# Patient Record
Sex: FEMALE | Race: BLACK OR AFRICAN AMERICAN | NOT HISPANIC OR LATINO | Employment: FULL TIME | URBAN - METROPOLITAN AREA
[De-identification: names, ages, dates, MRNs, and addresses within clinical notes are randomized per-mention and may not be internally consistent; named-entity substitution may affect disease eponyms.]

---

## 2019-04-09 ENCOUNTER — CONVERSION ENCOUNTER (OUTPATIENT)
Dept: INTERNAL MEDICINE | Facility: CLINIC | Age: 54
End: 2019-04-09

## 2019-04-09 ENCOUNTER — OFFICE VISIT CONVERTED (OUTPATIENT)
Dept: INTERNAL MEDICINE | Facility: CLINIC | Age: 54
End: 2019-04-09
Attending: INTERNAL MEDICINE

## 2019-05-22 ENCOUNTER — OFFICE VISIT CONVERTED (OUTPATIENT)
Dept: INTERNAL MEDICINE | Facility: CLINIC | Age: 54
End: 2019-05-22
Attending: PHYSICIAN ASSISTANT

## 2019-05-22 ENCOUNTER — HOSPITAL ENCOUNTER (OUTPATIENT)
Dept: OTHER | Facility: HOSPITAL | Age: 54
Discharge: HOME | End: 2019-05-22
Attending: PHYSICIAN ASSISTANT

## 2019-05-26 ENCOUNTER — HOSPITAL ENCOUNTER (OUTPATIENT)
Dept: URGENT CARE | Facility: CLINIC | Age: 54
Discharge: HOME | End: 2019-05-26

## 2019-05-28 LAB — BACTERIA SPEC AEROBE CULT: NORMAL

## 2019-09-16 ENCOUNTER — OFFICE VISIT CONVERTED (OUTPATIENT)
Dept: INTERNAL MEDICINE | Facility: CLINIC | Age: 54
End: 2019-09-16
Attending: INTERNAL MEDICINE

## 2019-10-29 ENCOUNTER — CONVERSION ENCOUNTER (OUTPATIENT)
Dept: OTHER | Facility: HOSPITAL | Age: 54
End: 2019-10-29

## 2019-10-29 ENCOUNTER — OFFICE VISIT CONVERTED (OUTPATIENT)
Dept: CARDIOLOGY | Facility: CLINIC | Age: 54
End: 2019-10-29
Attending: INTERNAL MEDICINE

## 2019-11-15 ENCOUNTER — CONVERSION ENCOUNTER (OUTPATIENT)
Dept: CARDIOLOGY | Facility: CLINIC | Age: 54
End: 2019-11-15
Attending: INTERNAL MEDICINE

## 2019-12-20 ENCOUNTER — HOSPITAL ENCOUNTER (OUTPATIENT)
Dept: GASTROENTEROLOGY | Facility: HOSPITAL | Age: 54
Setting detail: HOSPITAL OUTPATIENT SURGERY
Discharge: HOME | End: 2019-12-20
Attending: INTERNAL MEDICINE

## 2020-03-03 ENCOUNTER — OFFICE VISIT CONVERTED (OUTPATIENT)
Dept: INTERNAL MEDICINE | Facility: CLINIC | Age: 55
End: 2020-03-03
Attending: INTERNAL MEDICINE

## 2020-03-16 ENCOUNTER — OFFICE VISIT CONVERTED (OUTPATIENT)
Dept: INTERNAL MEDICINE | Facility: CLINIC | Age: 55
End: 2020-03-16
Attending: INTERNAL MEDICINE

## 2020-03-30 ENCOUNTER — HOSPITAL ENCOUNTER (OUTPATIENT)
Dept: GENERAL RADIOLOGY | Facility: HOSPITAL | Age: 55
Discharge: HOME | End: 2020-03-30
Attending: INTERNAL MEDICINE

## 2020-04-16 ENCOUNTER — TELEMEDICINE CONVERTED (OUTPATIENT)
Dept: NEUROSURGERY | Facility: CLINIC | Age: 55
End: 2020-04-16
Attending: PHYSICIAN ASSISTANT

## 2021-02-12 ENCOUNTER — HOSPITAL ENCOUNTER (OUTPATIENT)
Dept: OTHER | Facility: HOSPITAL | Age: 56
Discharge: HOME | End: 2021-02-12
Attending: PHYSICIAN ASSISTANT

## 2021-02-12 ENCOUNTER — OFFICE VISIT CONVERTED (OUTPATIENT)
Dept: INTERNAL MEDICINE | Facility: CLINIC | Age: 56
End: 2021-02-12
Attending: PHYSICIAN ASSISTANT

## 2021-02-12 LAB
ALBUMIN SERPL-MCNC: 4.4 G/DL (ref 3.5–5)
ALBUMIN/GLOB SERPL: 1.7 {RATIO} (ref 1.4–2.6)
ALP SERPL-CCNC: 64 U/L (ref 53–141)
ALT SERPL-CCNC: 19 U/L (ref 10–40)
ANION GAP SERPL CALC-SCNC: 13 MMOL/L (ref 8–19)
AST SERPL-CCNC: 16 U/L (ref 15–50)
BASOPHILS # BLD AUTO: 0.01 10*3/UL (ref 0–0.2)
BASOPHILS NFR BLD AUTO: 0.2 % (ref 0–3)
BILIRUB SERPL-MCNC: 0.17 MG/DL (ref 0.2–1.3)
BUN SERPL-MCNC: 15 MG/DL (ref 5–25)
BUN/CREAT SERPL: 25 {RATIO} (ref 6–20)
CALCIUM SERPL-MCNC: 9.2 MG/DL (ref 8.7–10.4)
CHLORIDE SERPL-SCNC: 103 MMOL/L (ref 99–111)
CONV ABS IMM GRAN: 0.01 10*3/UL (ref 0–0.2)
CONV CO2: 27 MMOL/L (ref 22–32)
CONV IMMATURE GRAN: 0.2 % (ref 0–1.8)
CONV TOTAL PROTEIN: 7 G/DL (ref 6.3–8.2)
CREAT UR-MCNC: 0.6 MG/DL (ref 0.5–0.9)
DEPRECATED RDW RBC AUTO: 48.1 FL (ref 36.4–46.3)
EOSINOPHIL # BLD AUTO: 0.12 10*3/UL (ref 0–0.7)
EOSINOPHIL # BLD AUTO: 2.6 % (ref 0–7)
ERYTHROCYTE [DISTWIDTH] IN BLOOD BY AUTOMATED COUNT: 14.9 % (ref 11.7–14.4)
GFR SERPLBLD BASED ON 1.73 SQ M-ARVRAT: >60 ML/MIN/{1.73_M2}
GLOBULIN UR ELPH-MCNC: 2.6 G/DL (ref 2–3.5)
GLUCOSE SERPL-MCNC: 97 MG/DL (ref 65–99)
HCT VFR BLD AUTO: 41 % (ref 37–47)
HGB BLD-MCNC: 13.2 G/DL (ref 12–16)
LYMPHOCYTES # BLD AUTO: 1.65 10*3/UL (ref 1–5)
LYMPHOCYTES NFR BLD AUTO: 36.2 % (ref 20–45)
MCH RBC QN AUTO: 28.3 PG (ref 27–31)
MCHC RBC AUTO-ENTMCNC: 32.2 G/DL (ref 33–37)
MCV RBC AUTO: 88 FL (ref 81–99)
MONOCYTES # BLD AUTO: 0.43 10*3/UL (ref 0.2–1.2)
MONOCYTES NFR BLD AUTO: 9.4 % (ref 3–10)
NEUTROPHILS # BLD AUTO: 2.34 10*3/UL (ref 2–8)
NEUTROPHILS NFR BLD AUTO: 51.4 % (ref 30–85)
NRBC CBCN: 0 % (ref 0–0.7)
OSMOLALITY SERPL CALC.SUM OF ELEC: 289 MOSM/KG (ref 273–304)
PLATELET # BLD AUTO: 186 10*3/UL (ref 130–400)
PMV BLD AUTO: 11.7 FL (ref 9.4–12.3)
POTASSIUM SERPL-SCNC: 3.7 MMOL/L (ref 3.5–5.3)
RBC # BLD AUTO: 4.66 10*6/UL (ref 4.2–5.4)
SODIUM SERPL-SCNC: 139 MMOL/L (ref 135–147)
WBC # BLD AUTO: 4.56 10*3/UL (ref 4.8–10.8)

## 2021-05-12 NOTE — PROGRESS NOTES
Quick Note      Patient Name: Lizbeth Sheikh   Patient ID: [id number]   Sex: Female   YOB: 1965    Primary Care Provider: Matt Glover MD   Referring Provider: Matt Glover MD    Visit Date: April 16, 2020    Provider: Nicolasa Briscoe PA-C   Location: [facility]   Location Address: [address] [place] [address]  [id number]   Location Phone: [phone]          History Of Present Illness  Video Conferencing Visit  Lizbeth Sheikh is a 55 year old /Black female who is presenting for evaluation via video conferencing. Verbal consent obtained before beginning visit.   The following staff were present during this visit: MARY BETH Barnett      Zoom telemedicine meeting ID: [id number] lasted 15 minutes.    She is a new patient to our clinic.  Started with pain in her left bicep region around the end of December with NKI.  Pain did not go below the elbow.  Then developed some pressure in the lateral neck/shoulders and some pain across the scapular region.  She attended PT which has helped the left bicep pain.  The tension in the shoulders continues but not as bad as prior to PT.  Working now on home stretching activities.  Pain improves with neck stretching such as neck extension. She denies arm weakness/numbness.  Pain is mild currently.  Was afraid to take the gabapentin after reading the side effect profile.     MRI cervical spine showed a slight left C5/6 disc/osteophyte complex causing moderate left foraminal stenosis which correlates with her C6 radiculopathy.  She has a slight right disc/osteophyte complex at C6/7 with moderate right foraminal stenosis.  Central canal is patent and no signal change in the cord.  These were the most notable findings.       Vitals     none       Physical Examination     FROM of the cervical spine and shoulders.  Gait and station are wnl.           Assessment  · Cervical radiculopathy     723.4/M54.12  · Cervical  disc disorder     722.91/M50.90      Plan  · Medications  o Medications have been Reconciled  o Transition of Care or Provider Policy  · Instructions  o Her pain has improved therefore an ACDF was not recommended at C5/6. She will continue to work on neck stretching and strengthening exercises and f/u as needed. If pain progresses she may consider CESB.             Electronically Signed by: Nicolasa Briscoe PA-C -Author on April 16, 2020 10:24:35 AM

## 2021-05-14 VITALS
DIASTOLIC BLOOD PRESSURE: 72 MMHG | OXYGEN SATURATION: 100 % | WEIGHT: 192 LBS | BODY MASS INDEX: 31.99 KG/M2 | RESPIRATION RATE: 15 BRPM | HEART RATE: 87 BPM | SYSTOLIC BLOOD PRESSURE: 130 MMHG | TEMPERATURE: 99 F | HEIGHT: 65 IN

## 2021-05-14 NOTE — PROGRESS NOTES
Progress Note      Patient Name: Lizbeth Sheikh   Patient ID: [id number]   Sex: Female   YOB: 1965    Primary Care Provider: Matt Glover MD   Referring Provider: Matt Glover MD    Visit Date: February 12, 2021    Provider: Angeles Brand PA-C   Location: [facility]   Location Address: [address] [place] [address], [address]  [place], [place]  [id number]   Location Phone: [phone]          Chief Complaint  · burning sensation      History Of Present Illness  Lizbeth Sheikh is a 56 year old /Black female who presents for evaluation and treatment of:      burning sensation of R hip and groin area x5 days.  She has pain in lower R side of lower back.  Burning pain is constant. Pain is also achy at times.   Pain worse if she lays on that side.  Pain moves from lower back, down R buttock and around to R groin.   Denies swelling of area or discoloration. Denies rash or blisters.  Denies pain in legs. Denies numbness/tingling down leg.   Denies incontinence, saddle anesthesia.   denies extra lifting, pushing, pulling. Denies known injury or trauma.   Denies fever.  Denies hx of shingles.  She took motrin which helps slightly.       Past Medical History  Disease Name Date Onset Notes   Anemia --  --    Asthma --  --    Colon abnormality --  --    GERD (gastroesophageal reflux disease) --  --    Heart Murmur --  --    Hemorrhoids --  --    Hernia --  --    Night sweats --  --          Past Surgical History  Procedure Name Date Notes   Colonoscopy 2009 [place]   Hysterectomy --  --          Medication List  Name Date Started Instructions   albuterol sulfate inhalation  --    Linzess 145 mcg oral capsule  take 1 capsule (145 mcg) by oral route once daily on an empty stomach at least 30 minutes before 1st meal of the day   omeprazole 40 mg oral capsule,delayed release(DR/EC)  take 1 capsule (40 mg) by oral route once daily before a meal   Restasis 0.05 % ophthalmic  "(eye) dropperette  instill 1 drop into right eye by ophthalmic route every 12 hours   Singulair 10 mg oral tablet 03/16/2020 take 1 tablet (10 mg) by oral route once daily in the evening for 90 days   spironolactone 25 mg oral tablet 03/03/2020 take 1 tablet (25 mg) by oral route once daily for 90 days   vitamin B12-folic acid 1,000-400 mcg sublingual lozenge  --    Zaditor 0.025 % (0.035 %) ophthalmic (eye) drops 10/08/2019 instill 1 drop into affected eye(s) by ophthalmic route 2 times per day   Zyrtec 10 mg oral tablet  take 1 tablet (10 mg) by oral route once daily         Allergy List  Allergen Name Date Reaction Notes   iodine --  --  --        Allergies Reconciled  Family Medical History  Disease Name Relative/Age Notes   Breast Neoplasm, Malignant Sister/44   --    Brain Neoplasm, Malignant Sister/75   --    Heart Disease  --    Lung cancer  --    Diabetes Mellitus, Type II  --    No family history of colorectal cancer  --          Social History  Finding Status Start/Stop Quantity Notes   Alcohol Never --/-- --  --    Tobacco Former --/-- --  --          Immunizations  NameDate Admin Mfg Trade Name Lot Number Route Inj VIS Given VIS Publication   InfluenzaDeferred 03/16/2020 NE Not Entered  NE NE     Comments:          Vitals  Date Time BP Position Site L\R Cuff Size HR RR TEMP (F) WT  HT  BMI kg/m2 BSA m2 O2 Sat FR L/min FiO2 HC       03/03/2020 02:50 /78 Sitting    80 - R 16 98.2 195lbs 4oz 5'  5\" 32.49 2.02 98 %  21%    03/16/2020 08:18 /70 Sitting    80 - R  97.2 194lbs 2oz 5'  5\" 32.3 2.01 98 %      02/12/2021 03:49 /72 Sitting    87 - R 15 99 192lbs 0oz 5'  5\" 31.95 2 100 %            Physical Examination  · Constitutional  o Appearance  o : no acute distress, well-nourished  · Head and Face  o Head  o :   § Inspection  § : atraumatic, normocephalic  · Eyes  o Eyes  o : extraocular movements intact, no scleral icterus, no conjunctival injection  · Ears, Nose, Mouth and " Throat  o Ears  o :   § External Ears  § : normal  o Nose  o :   § Intranasal Exam  § : nares patent  o Oral Cavity  o :   § Oral Mucosa  § : moist mucous membranes  · Respiratory  o Respiratory Effort  o : breathing comfortably, symmetric chest rise  o Auscultation of Lungs  o : clear to asculatation bilaterally, no wheezes, rales, or rhonchii  · Cardiovascular  o Heart  o :   § Auscultation of Heart  § : regular rate and rhythm, no murmurs, rubs, or gallops  o Peripheral Vascular System  o :   § Extremities  § : no edema  · Lymphatic  o Neck  o : no lymphadenopathy present  o Supraclavicular Nodes  o : no supraclavicular nodes  · Skin and Subcutaneous Tissue  o General Inspection  o : no lesions present, no areas of discoloration, skin turgor normal  · Neurologic  o Mental Status Examination  o :   § Orientation  § : grossly oriented to person, place and time  o Gait and Station  o :   § Gait Screening  § : normal gait  · Psychiatric  o General  o : normal mood and affect     MSK: Tension noted on L spine R sided lower paraspinal muscles. NROM R hip and L spine               Assessment  · Low back pain     724.2/M54.5  Discussed ddx pain. Low concern for shingles due to no rash, ttp, and improvement with otc nsaid. Will start nsaid scheduled and muscle relaxer prn. . Do not use muscle relaxer before work, driving, or operating heavy machinery. Heat/ice for symptomatic relief. Patient advised to rest initially and then slowly increase activity level. Monitor changes in symptoms such as numbness, tingling or weakness in legs, changes in bowel or bladder habits or worsening back pain. Proper ergonomics discussed. PT will let us know if no improvement with conservative tx or sooner if sx worsen. Will get labs today since no recent labs in the past year to make sure kidney function can tolerate short course of scheduled nsaid.  · Right hip pain     719.45/M25.551  · Muscle  spasm     728.85/M62.838      Plan  · Orders  o CBC with Auto Diff [facility] ([id number]) - 724.2/M54.5, 719.45/M25.551, 728.85/M62.838 - 02/12/2021  o CMP [facility] ([id number]) - 724.2/M54.5, 719.45/M25.551, 728.85/M62.838 - 02/12/2021  o ACO-39: Current medications updated and reviewed (, 1159F) - - 02/12/2021  · Medications  o cyclobenzaprine 10 mg oral tablet   SIG: take 1 tablet by oral route daily prn muscle spasm   DISP: (30) Tablet with 0 refills  Prescribed on 02/12/2021     o Medications have been Reconciled  o Transition of Care or Provider Policy  · Instructions  o Handouts were given to patient: lbp  o Patient was educated/instructed on their diagnosis, treatment and medications prior to discharge from the clinic today.  o Electronically Identified Patient Education Materials Provided Electronically  · Disposition  o Call or Return if symptoms worsen or persist.  o Keep follow up appt as scheduled            Electronically Signed by: Angeles Brand PA-C -Author on February 13, 2021 08:50:17 AM  Electronically Co-signed by: Rekha Andres MD -Reviewer on February 15, 2021 11:15:41 AM

## 2021-05-15 VITALS
BODY MASS INDEX: 32.34 KG/M2 | HEIGHT: 65 IN | DIASTOLIC BLOOD PRESSURE: 70 MMHG | SYSTOLIC BLOOD PRESSURE: 122 MMHG | TEMPERATURE: 97.2 F | HEART RATE: 80 BPM | OXYGEN SATURATION: 98 % | WEIGHT: 194.12 LBS

## 2021-05-15 VITALS
OXYGEN SATURATION: 97 % | BODY MASS INDEX: 31.49 KG/M2 | SYSTOLIC BLOOD PRESSURE: 126 MMHG | HEIGHT: 65 IN | WEIGHT: 189 LBS | DIASTOLIC BLOOD PRESSURE: 74 MMHG | TEMPERATURE: 98.6 F | HEART RATE: 66 BPM

## 2021-05-15 VITALS
HEART RATE: 79 BPM | SYSTOLIC BLOOD PRESSURE: 139 MMHG | HEIGHT: 65 IN | WEIGHT: 188.12 LBS | BODY MASS INDEX: 31.34 KG/M2 | DIASTOLIC BLOOD PRESSURE: 62 MMHG

## 2021-05-15 VITALS
SYSTOLIC BLOOD PRESSURE: 124 MMHG | TEMPERATURE: 98.7 F | HEIGHT: 65 IN | WEIGHT: 191 LBS | BODY MASS INDEX: 31.82 KG/M2 | DIASTOLIC BLOOD PRESSURE: 68 MMHG | HEART RATE: 71 BPM | OXYGEN SATURATION: 98 %

## 2021-05-15 VITALS
HEART RATE: 80 BPM | DIASTOLIC BLOOD PRESSURE: 78 MMHG | TEMPERATURE: 98.2 F | OXYGEN SATURATION: 98 % | RESPIRATION RATE: 16 BRPM | WEIGHT: 195.25 LBS | HEIGHT: 65 IN | SYSTOLIC BLOOD PRESSURE: 126 MMHG | BODY MASS INDEX: 32.53 KG/M2

## 2021-05-15 VITALS
WEIGHT: 188.12 LBS | DIASTOLIC BLOOD PRESSURE: 88 MMHG | SYSTOLIC BLOOD PRESSURE: 124 MMHG | TEMPERATURE: 97 F | HEIGHT: 65 IN | BODY MASS INDEX: 31.34 KG/M2 | HEART RATE: 82 BPM | OXYGEN SATURATION: 98 %

## 2021-05-17 ENCOUNTER — CONVERSION ENCOUNTER (OUTPATIENT)
Dept: CARDIOLOGY | Facility: CLINIC | Age: 56
End: 2021-05-17

## 2021-05-17 ENCOUNTER — OFFICE VISIT CONVERTED (OUTPATIENT)
Dept: CARDIOLOGY | Facility: CLINIC | Age: 56
End: 2021-05-17
Attending: INTERNAL MEDICINE

## 2021-05-27 ENCOUNTER — TRANSCRIBE ORDERS (OUTPATIENT)
Dept: ADMINISTRATIVE | Facility: HOSPITAL | Age: 56
End: 2021-05-27

## 2021-05-27 DIAGNOSIS — N64.4 MASTODYNIA: Primary | ICD-10-CM

## 2021-06-05 NOTE — H&P
History and Physical      Patient Name: Lizbeth Sheikh   Patient ID: [id number]   Sex: Female   YOB: 1965    Primary Care Provider: Matt Glover MD   Referring Provider: Matt Glover MD    Visit Date: May 17, 2021    Provider: Rohan De La O MD   Location: [facility]   Location Address: [address] [place] [address]  [id number]          History Of Present Illness  Consult requested by: Matt Glover MD   Lizbeth Sheikh is a 56-year-old -American/Black female who was seen by me in the office today. She has no previous cardiac history. A couple of months ago she started to have left arm discomfort and shoulder discomfort. This settled then in the left lower chest, described as sharp pain, worse with deep breath or certain movements. It lasted for two weeks and then went away completely. She did go to the emergency room for further evaluation. Her workup there was negative. [facility] [place] She was called and told that these were negative, but I do not have these reports in hand yet. She otherwise stays relatively active and is not having exertional symptoms at all at this point.   PAST MEDICAL HISTORY: Sleep apnea; heart murmur; arthritis.   PSYCHOSOCIAL HISTORY: The patient is single. No history of mood change or depression. Denies alcohol or tobacco use. Daily caffeine use.   FAMILY HISTORY: Positive for diabetes and hypertension. Negative for heart disease.   CURRENT MEDICATIONS: Linzess 30 mg daily; Cetirizine 10 mg daily; Proventil p.r.n.; Advair daily; Omeprazole daily; Olopatadine daily; Restasis b.i.d.; Refresh four times a day; Flonase daily; vitamin D with calcium b.i.d.; Probiotic b.i.d.; Gabapentin 300 mg two tablets daily.   ALLERGIES: Iodine.       Review of Systems  · Constitutional  o Admits  o : fatigue, recent weight changes   o Denies  o : good general health  "lately  · Eyes  o Admits  o : double vision, blurred vision  · HENT  o Admits  o : hearing loss or ringing, chronic sinus problem  o Denies  o : swollen glands in neck  · Cardiovascular  o Admits  o : chest pain  o Denies  o : palpitations (fast, fluttering, or skipping beats), swelling (feet, ankles, hands), shortness of breath while walking or lying flat  · Respiratory  o Admits  o : asthma or wheezing  o Denies  o : chronic or frequent cough, COPD  · Gastrointestinal  o Denies  o : ulcers, nausea or vomiting  · Neurologic  o Admits  o : headaches  o Denies  o : lightheaded or dizzy, stroke  · Musculoskeletal  o Admits  o : joint pain, back pain  · Endocrine  o Denies  o : thyroid disease, diabetes, heat or cold intolerance, excessive thirst or urination  · Heme-Lymph  o Admits  o : anemia  o Denies  o : bleeding or bruising tendency      Vitals  Date Time BP Position Site L\R Cuff Size HR RR TEMP (F) WT  HT  BMI kg/m2 BSA m2 O2 Sat FR L/min FiO2 HC       05/17/2021 02:59 /58 Sitting    66 - R   188lbs 0oz 5'  4\" 32.27 1.96       05/17/2021 02:59 /64 Sitting    62 - R   188lbs 0oz 5'  4\" 32.27 1.96             Physical Examination  · Constitutional  o Appearance  o : Awake, alert, in no acute distress.  · Head and Face  o HEENT  o : No pallor, anicteric. Eyes normal. Moist mucous membranes.  · Neck  o Inspection/Palpation  o : Supple.   o Jugular Veins  o : No JVD. No carotid bruits.  · Respiratory  o Auscultation of Lungs  o : Clear to auscultation bilaterally. No crackles or wheezing.  · Cardiovascular  o Heart  o : Soft basal systolic murmur which is chronic.   · Gastrointestinal  o Abdominal Examination  o : Soft, non-distended. No palpable hepatosplenomegaly. Bowel sounds heard in all four quadrants.  · Musculoskeletal  o General  o : Normal muscle tone and strength.  · Skin and Subcutaneous Tissue  o General Inspection  o : No skin rashes.  · Extremities  o Extremities  o : Warm and well " perfused. Distal pulses present. No pitting pedal edema.  · EKG  o EKG  o : Obtained March 5 and reviewed by me.  o Results  o : Sinus rhythm, normal intervals. No ST changes.  · Labs  o Labs  o : Laboratory studies reviewed. Troponins were negative. D-dimer negative. Chemistry negative.          Assessment     1.  Chest pain, atypical. Sounds like pleurisy from the patient's description. It lasted for approximate two weeks        and has resolved completely. Her baseline EKG showed no acute changes. Biomarkers were negative. She        apparently [facility] [place] and she was called        and told these were normal but I do not have the formal reports yet.  2.  Mild hypertension.       Plan     Based on her history and description of symptoms and noninvasive testing, it is unlikely that she has underlying heart disease causing these symptoms. At this time I do not recommend any additional workup. I will review [facility] records when they get sent, but currently we do  ot have them yet. She will otherwise be followed as needed. It is a pleasure to assist in her care.      KAMRON De La O MD   CBD/pap             Electronically Signed by: Xenia Hyatt-, Other -Author on May 18, 2021 12:50:56 PM  Electronically Co-signed by: Rohan De La O MD -Reviewer on May 18, 2021 04:15:07 PM

## 2021-06-10 ENCOUNTER — HOSPITAL ENCOUNTER (OUTPATIENT)
Dept: OTHER | Facility: HOSPITAL | Age: 56
Discharge: HOME | End: 2021-06-10

## 2021-06-10 ENCOUNTER — HOSPITAL ENCOUNTER (OUTPATIENT)
Dept: ULTRASOUND IMAGING | Facility: HOSPITAL | Age: 56
Discharge: HOME | End: 2021-06-10

## 2021-06-10 ENCOUNTER — HOSPITAL ENCOUNTER (OUTPATIENT)
Dept: MAMMOGRAPHY | Facility: HOSPITAL | Age: 56
Discharge: HOME | End: 2021-06-10

## 2021-06-10 DIAGNOSIS — N64.4 MASTODYNIA: ICD-10-CM

## 2021-06-10 DIAGNOSIS — Z09 FOLLOW UP: ICD-10-CM

## 2021-06-10 PROCEDURE — 77066 DX MAMMO INCL CAD BI: CPT

## 2021-06-10 PROCEDURE — 76642 ULTRASOUND BREAST LIMITED: CPT

## 2021-06-10 PROCEDURE — G0279 TOMOSYNTHESIS, MAMMO: HCPCS

## 2021-07-15 VITALS
HEART RATE: 66 BPM | DIASTOLIC BLOOD PRESSURE: 58 MMHG | HEIGHT: 64 IN | WEIGHT: 188 LBS | SYSTOLIC BLOOD PRESSURE: 148 MMHG | BODY MASS INDEX: 32.1 KG/M2

## 2021-07-19 ENCOUNTER — OFFICE VISIT (OUTPATIENT)
Dept: INTERNAL MEDICINE | Facility: CLINIC | Age: 56
End: 2021-07-19

## 2021-07-19 VITALS
WEIGHT: 189 LBS | RESPIRATION RATE: 15 BRPM | OXYGEN SATURATION: 98 % | TEMPERATURE: 97.7 F | HEART RATE: 76 BPM | DIASTOLIC BLOOD PRESSURE: 64 MMHG | SYSTOLIC BLOOD PRESSURE: 128 MMHG | HEIGHT: 64 IN | BODY MASS INDEX: 32.27 KG/M2

## 2021-07-19 DIAGNOSIS — Z13.220 SCREENING CHOLESTEROL LEVEL: ICD-10-CM

## 2021-07-19 DIAGNOSIS — K21.9 GERD WITHOUT ESOPHAGITIS: ICD-10-CM

## 2021-07-19 DIAGNOSIS — J45.20 MILD INTERMITTENT ASTHMA WITHOUT COMPLICATION: ICD-10-CM

## 2021-07-19 DIAGNOSIS — M50.30 DEGENERATIVE DISC DISEASE, CERVICAL: Primary | ICD-10-CM

## 2021-07-19 DIAGNOSIS — Z13.29 THYROID DISORDER SCREENING: ICD-10-CM

## 2021-07-19 DIAGNOSIS — J30.9 ALLERGIC RHINITIS, UNSPECIFIED SEASONALITY, UNSPECIFIED TRIGGER: ICD-10-CM

## 2021-07-19 PROCEDURE — 99214 OFFICE O/P EST MOD 30 MIN: CPT | Performed by: PHYSICIAN ASSISTANT

## 2021-07-19 RX ORDER — OMEPRAZOLE 40 MG/1
CAPSULE, DELAYED RELEASE ORAL
COMMUNITY

## 2021-07-19 RX ORDER — SPIRONOLACTONE 25 MG/1
25 TABLET ORAL
COMMUNITY

## 2021-07-19 RX ORDER — SELENIUM 50 MCG
TABLET ORAL
COMMUNITY

## 2021-07-19 RX ORDER — ALBUTEROL SULFATE 90 UG/1
2 AEROSOL, METERED RESPIRATORY (INHALATION)
COMMUNITY

## 2021-07-19 RX ORDER — CYCLOSPORINE 0.5 MG/ML
1 EMULSION OPHTHALMIC
COMMUNITY

## 2021-07-19 RX ORDER — CETIRIZINE HYDROCHLORIDE 10 MG/1
TABLET ORAL
COMMUNITY

## 2021-07-19 RX ORDER — FLUTICASONE PROPIONATE 50 MCG
2 SPRAY, SUSPENSION (ML) NASAL DAILY
COMMUNITY

## 2021-07-19 RX ORDER — GABAPENTIN 300 MG/1
300 CAPSULE ORAL 3 TIMES DAILY
COMMUNITY
End: 2021-10-14

## 2021-07-19 NOTE — PROGRESS NOTES
Chief Complaint  Follow-up (previous pcp Dr. Glover), neurosurgery referral (needs to see Dr. Vega office for pinched nerve in neck), and need Drs note to continue tele work due to asthma    Subjective          Lizbeth Sheikh presents to [facility]  Follow up    LBP: seeing Dr. Salgado office for pinched nerve in her neck  Needs a new referral.    Asthma: would like a note to cont telehealth from home due to her asthma  Pt uses albuterol prn, has had to use it a few wks ago.  Pt taking advair daily.  She recently started allergy injections.  She is getting sinus surgery in surgery 2021    Colonoscopy: 2019   Mammogram: 5/2021  Pap: at [place] 2021, wnl per pt    GERD: stable with current meds  No sx      Past Medical History:   Diagnosis Date   • Anemia    • Asthma    • Colon abnormality    • GERD (gastroesophageal reflux disease)    • Heart murmur    • Hemorrhoids    • Hernia cerebri (CMS/HCC)    • Night sweats         Past Surgical History:   Procedure Laterality Date   • BREAST BIOPSY Right    • COL[place]OSCOPY  2009    [place]   • HYSTERECTOMY          Current Outpatient Medications on File Prior to Visit   Medication Sig Dispense Refill   • albuterol sulfate  (90 Base) MCG/ACT inhaler Inhale 2 puffs.     • cetirizine (ZyrTEC Allergy) 10 MG tablet Zyrtec 10 mg oral tablet take 1 tablet (10 mg) by oral route once daily   Active     • Cholecalciferol 25 MCG (1000 UT) capsule Take 1,000 Units by mouth Daily.     • cycloSPORINE (Restasis) 0.05 % ophthalmic emulsion 1 drop.     • fluticasone (FLONASE) 50 MCG/ACT nasal spray 2 sprays into the nostril(s) as directed by provider Daily.     • gabapentin (NEURONTIN) 300 MG capsule Take 300 mg by mouth 3 (Three) Times a Day.     • Lactobacillus (Acidophilus) capsule Take  by mouth.     • linaclotide (Linzess) 145 MCG capsule capsule Linzess 145 mcg oral capsule take 1 capsule (145 mcg) by oral route once daily on  "an empty stomach at least 30 minutes before 1st meal of the day   Active     • omeprazole (priLOSEC) 40 MG capsule omeprazole 40 mg oral capsule,delayed release(DR/EC) take 1 capsule (40 mg) by oral route once daily before a meal   Active     • spironolactone (ALDACTONE) 25 MG tablet Take 25 mg by mouth.       No current facility-administered medications on file prior to visit.        Allergies   Allergen Reactions   • Iodine Shortness Of Breath and Swelling       Social History     Tobacco Use   Smoking Status Former Smoker   Smokeless Tobacco Never Used          Objective   Vital Signs:   /64   Pulse 76   Temp 97.7 °F (36.5 °C)   Resp 15   Ht 162.6 cm (64\")   Wt 85.7 kg (189 lb)   SpO2 98%   BMI 32.44 kg/m²     Physical Exam  Vitals reviewed.   Constitutional:       Appearance: Normal appearance.   HENT:      Head: Normocephalic and atraumatic.      Nose: Nose normal.      Mouth/Throat:      Mouth: Mucous membranes are moist.   Eyes:      Extraocular Movements: Extraocular movements intact.      Conjunctiva/sclera: Conjunctivae normal.      Pupils: Pupils are equal, round, and reactive to light.   Cardiovascular:      Rate and Rhythm: Normal rate and regular rhythm.   Pulmonary:      Effort: Pulmonary effort is normal.      Breath sounds: Normal breath sounds.   Abdominal:      General: Abdomen is flat. Bowel sounds are normal.      Palpations: Abdomen is soft.   Musculoskeletal:         General: Normal range of motion.   Neurological:      General: No focal deficit present.      Mental Status: She is alert and oriented to person, place, and time.   Psychiatric:         Mood and Affect: Mood normal.        Result Review :                 Assessment and Plan    Diagnoses and all orders for this visit:    1. Degenerative disc disease, cervical (Primary)  Comments:  referral placed for neurosurg eval. Reviewed last mri from 3/2020.   Orders:  -     Ambulatory Referral to Neurosurgery    2. Mild " intermittent asthma without complication  Comments:  Cont current meds and use of albuterol prn. Note given today stating that pt is being tx for asthma    Orders:  -     Comprehensive Metabolic Panel  -     CBC & Differential  -     TSH  -     Lipid Panel    3. GERD without esophagitis  Comments:  cont current meds  Orders:  -     Comprehensive Metabolic Panel  -     CBC & Differential  -     TSH  -     Lipid Panel    4. Allergic rhinitis, unspecified seasonality, unspecified trigger  -     Comprehensive Metabolic Panel  -     CBC & Differential  -     TSH  -     Lipid Panel    5. Screening cholesterol level  -     Lipid Panel    6. Thyroid disorder screening  -     TSH        Follow Up   Return in about 6 months (around 1/19/2022).  Patient was given instructions and counseling regarding her condition or for health maintenance advice. Please see specific information pulled into the AVS if appropriate.

## 2021-07-20 PROBLEM — M50.30 DEGENERATIVE DISC DISEASE, CERVICAL: Status: ACTIVE | Noted: 2021-07-20

## 2021-07-20 PROBLEM — J45.20 MILD INTERMITTENT ASTHMA WITHOUT COMPLICATION: Status: ACTIVE | Noted: 2021-07-20

## 2021-07-20 PROBLEM — K21.9 GERD WITHOUT ESOPHAGITIS: Status: ACTIVE | Noted: 2021-07-20

## 2021-08-31 ENCOUNTER — OFFICE VISIT (OUTPATIENT)
Dept: NEUROSURGERY | Facility: CLINIC | Age: 56
End: 2021-08-31

## 2021-08-31 VITALS
BODY MASS INDEX: 32.27 KG/M2 | SYSTOLIC BLOOD PRESSURE: 127 MMHG | HEART RATE: 60 BPM | HEIGHT: 64 IN | DIASTOLIC BLOOD PRESSURE: 56 MMHG | WEIGHT: 189 LBS

## 2021-08-31 DIAGNOSIS — M54.12 CERVICAL RADICULOPATHY: Primary | ICD-10-CM

## 2021-08-31 DIAGNOSIS — M47.812 CERVICAL SPONDYLOSIS WITHOUT MYELOPATHY: ICD-10-CM

## 2021-08-31 PROCEDURE — 99214 OFFICE O/P EST MOD 30 MIN: CPT | Performed by: NURSE PRACTITIONER

## 2021-08-31 NOTE — PROGRESS NOTES
Chief Complaint  Neck Pain    Subjective          Lizbeth Sheikh who is a 56 y.o. year old female who presents to [facility]UROLOG[facility] NE[facility]URGERY for follow up of her neck pain with radiculopathy.    Pt last seen via Telemedicine for her neck pain with radiculopathy. This was during the pandemic. She was doing home exercises. Prescribed Gabapentin though never took this due to concerns of side effects. Her pain has remained the same, appears she was lost in follow up and required a new  referral to be seen. She has mild neck pain with radiating pain into the upper extremities. She has not had type of intervention for her pain.      Interval History   Video Conferencing Visit  Lizbeth Sheikh is a 55 year old /Black female who is presenting for evaluation via video conferencing. Verbal consent obtained before beginning visit.   The following staff were present during this visit: MARY BETH Barnett       [email or web address] telemedicine meeting ID: [id number] lasted 15 minutes.    She is a new patient to our clinic.  Started with pain in her left bicep region around the end of December with NKI.  Pain did not go below the elbow.  Then developed some pressure in the lateral neck/shoulders and some pain across the scapular region.  She attended PT which has helped the left bicep pain.  The tension in the shoulders continues but not as bad as prior to PT.  Working now on home stretching activities.  Pain improves with neck stretching such as neck extension. She denies arm weakness/numbness.  Pain is mild currently.  Was afraid to take the gabapentin after reading the side effect profile.     MRI cervical spine showed a slight left C5/6 disc/osteophyte complex causing moderate left foraminal stenosis which correlates with her C6 radiculopathy.  She has a slight right disc/osteophyte complex at C6/7 with moderate right foraminal stenosis.  Central canal is patent and no signal change in the  "cord.  These were the most notable findings.       Recent Interventions: None      Review of Systems   Musculoskeletal: Positive for neck pain and neck stiffness.   Neurological: Positive for numbness.        Objective   Vital Signs:   /56   Pulse 60   Ht 162.6 cm (64\")   Wt 85.7 kg (189 lb)   BMI 32.44 kg/m²       Physical Exam  Vitals reviewed.   Constitutional:       Appearance: Normal appearance.   Neurological:      Mental Status: She is alert and oriented to person, place, and time.      Gait: Gait is intact.      Deep Tendon Reflexes: Strength normal.      Reflex Scores:       Tricep reflexes are 1+ on the right side and 1+ on the left side.       Bicep reflexes are 1+ on the right side and 1+ on the left side.       Brachioradialis reflexes are 2+ on the right side and 2+ on the left side.       Neurologic Exam     Mental Status   Oriented to person, place, and time.   Level of consciousness: alert    Motor Exam   Muscle bulk: normal  Overall muscle tone: normal    Strength   Strength 5/5 throughout.     Sensory Exam   Light touch normal.     Gait, Coordination, and Reflexes     Gait  Gait: normal    Reflexes   Right brachioradialis: 2+  Left brachioradialis: 2+  Right biceps: 1+  Left biceps: 1+  Right triceps: 1+  Left triceps: 1+  Right Lorenzo: absent  Left Lorenzo: absent       Result Review :       Data reviewed: Radiologic studies MRI Cervical Spine from March 2020 showed multilevel degenerative changes with facet arthropay, resulting in moderate right foraminal narrowing at C6/7, moderate left foraminal narrowing at C5/6. No canal stenosis.          Assessment and Plan    Diagnoses and all orders for this visit:    1. Cervical radiculopathy (Primary)  -     Ambulatory Referral to Physical Therapy Evaluate and treat; Heat; Moist heat; Cross Fiber; Stretching (Traction), ROM, Strengthening    2. Cervical spondylosis without myelopathy  -     Ambulatory Referral to Physical Therapy Evaluate " and treat; Heat; Moist heat; Cross Fiber; Stretching (Traction), ROM, Strengthening    We discussed referral to PT vs Pain management for injections. No surgical recommendations at this time. She would like to proceed with PT for traction, stretching, ROM. Will follow up in 6 weeks and if no improvement will consider pain management referral at that time. Pt declines medication management.     I spent 32 minutes caring for Lizbeth on this date of service. This time includes time spent by me in the following activities:preparing for the visit, reviewing tests, obtaining and/or reviewing a separately obtained history, performing a medically appropriate examination and/or evaluation , counseling and educating the patient/family/caregiver, referring and communicating with other health care professionals , documenting information in the medical record and independently interpreting results and communicating that information with the patient/family/caregiver.    Follow Up   Return in about 6 weeks (around 10/12/2021).  Patient was given instructions and counseling regarding her condition or for health maintenance advice.     -Physical therapy x6 weeks  -Follow up in 6 weeks

## 2021-10-14 ENCOUNTER — OFFICE VISIT (OUTPATIENT)
Dept: NEUROSURGERY | Facility: CLINIC | Age: 56
End: 2021-10-14

## 2021-10-14 VITALS — HEART RATE: 86 BPM | HEIGHT: 64 IN | WEIGHT: 190 LBS | BODY MASS INDEX: 32.44 KG/M2

## 2021-10-14 DIAGNOSIS — M54.2 CERVICALGIA: ICD-10-CM

## 2021-10-14 DIAGNOSIS — M47.812 CERVICAL SPONDYLOSIS WITHOUT MYELOPATHY: Primary | ICD-10-CM

## 2021-10-14 DIAGNOSIS — G44.86 CERVICOGENIC HEADACHE: ICD-10-CM

## 2021-10-14 PROCEDURE — 99213 OFFICE O/P EST LOW 20 MIN: CPT | Performed by: NURSE PRACTITIONER

## 2021-10-14 RX ORDER — METHYLPREDNISOLONE 4 MG/1
TABLET ORAL
Qty: 21 TABLET | Refills: 0 | Status: SHIPPED | OUTPATIENT
Start: 2021-10-14 | End: 2021-11-09

## 2021-10-14 RX ORDER — TIZANIDINE 2 MG/1
2-4 TABLET ORAL NIGHTLY PRN
Qty: 60 TABLET | Refills: 2 | Status: SHIPPED | OUTPATIENT
Start: 2021-10-14

## 2021-10-14 NOTE — PROGRESS NOTES
"Chief Complaint  Neck Pain    Subjective          Lizbeth Sheikh who is a 56 y.o. year old female who presents to [facility] NEUROLOGY & NEUROSURGERY for follow up of neck pain with radiculopathy.     At patient's last visit we referred to physical therapy. She had planned to go but could not afford the copay. [facility] Having pain primarily in the neck. She is now having concerns of daily headache, starting in the back of her head and radiating up into the temporal region. This occurs on both sides. Pain is aching, throbbing, severe at times. Effecting her sleep. She has been taking ibuprofen without much relief.         Interval History 8/31/21     Lizbeth Sheikh who is a 56 y.o. year old female who presents to [facility] NEUROLOGY & NEUROSURGERY for follow up of her neck pain with radiculopathy.     Pt last seen via Telemedicine for her neck pain with radiculopathy. This was during the pandemic. She was doing home exercises. Prescribed Gabapentin though never took this due to concerns of side effects. Her pain has remained the same, appears she was lost in follow up and required a new  referral to be seen. She has mild neck pain with radiating pain into the upper extremities. She has not had type of intervention for her pain.     Recent Interventions: PT      Review of Systems   Musculoskeletal: Positive for neck pain and neck stiffness.   Neurological: Positive for headache.   All other systems reviewed and are negative.       Objective   Vital Signs:   Pulse 86   Ht 162.6 cm (64\")   Wt 86.2 kg (190 lb)   BMI 32.61 kg/m²       Physical Exam  Vitals reviewed.   Constitutional:       Appearance: Normal appearance.   Musculoskeletal:      Cervical back: Spasms and tenderness present.   Neurological:      Mental Status: She is alert and oriented to person, place, and time.      Gait: Gait is intact.      Deep Tendon " Reflexes: Strength normal.        Neurologic Exam     Mental Status   Oriented to person, place, and time.   Level of consciousness: alert    Motor Exam   Muscle bulk: normal  Overall muscle tone: normal    Strength   Strength 5/5 throughout.     Gait, Coordination, and Reflexes     Gait  Gait: normal       Result Review :                 Assessment and Plan    Diagnoses and all orders for this visit:    1. Cervical spondylosis without myelopathy (Primary)    2. Cervicogenic headache  -     methylPREDNISolone (MEDROL) 4 MG dose pack; Take as directed on package instructions.  Dispense: 21 tablet; Refill: 0  -     tiZANidine (ZANAFLEX) 2 MG tablet; Take 1-2 tablets by mouth At Night As Needed for Muscle Spasms.  Dispense: 60 tablet; Refill: 2    3. Cervicalgia  -     methylPREDNISolone (MEDROL) 4 MG dose pack; Take as directed on package instructions.  Dispense: 21 tablet; Refill: 0  -     tiZANidine (ZANAFLEX) 2 MG tablet; Take 1-2 tablets by mouth At Night As Needed for Muscle Spasms.  Dispense: 60 tablet; Refill: 2    Pt with concerns of neck pain and more recently headache, cervicogenic in nature. She has not started PT as [facility] is [facility]. Will start Tizanidine 2-4 mg at bedtime and Medrol dospak for her headache. She will follow up in our clinic on an as needed basis.       Follow Up   Return if symptoms worsen or fail to improve.  Patient was given instructions and counseling regarding her condition or for health maintenance advice.     -Medrol dospak take as directed  -Tizanidine 2-4 mg at bedtime  -Follow up as needed

## 2021-11-01 ENCOUNTER — TELEPHONE (OUTPATIENT)
Dept: NEUROSURGERY | Facility: CLINIC | Age: 56
End: 2021-11-01

## 2021-11-01 NOTE — TELEPHONE ENCOUNTER
Caller: Lizbeth Sheikh  Relationship: Self  Best call back number: [phone]    What was the call regarding:     PATIENT CALLED TO SCHEDULE A FOLLOW UP.PATIENT HAS NO RECENT IMAGING, HER LAST MRI WAS 03/30/2020.    SHE STATES THAT EARLY LAST WEEK (Tuesday OR Wednesday) SHE BEGAN TO EXPERIENCE A NUMBNESS AND TINGLING IN TWO OF THE FINGERS OF HER LEFT HAND,  THE PINKY  AND RING FINGER.   SHE STATES THAT THIS NUMBNESS AND TINGLING OCCURS ALL THE TIME BUT DOES NOT EFFECT HER , IT IS JUST THE SENSATION.     PLEASE ADVISE IF PATIENT NEEDS UPDATED IMAGING PRIOR TO BEING SCHEDULED A FOLLOW UP., PATIENT WAS LAST SEEN 10/14/2021.

## 2021-11-01 NOTE — TELEPHONE ENCOUNTER
Called: Lizbeth Sheikh  Relationship: Self  Best call back number: [phone]    What was the call regarding:   CALLED PATIENT BACK. PATIENT IS SCHEDULED FOR 11/09/2021 WITH KENYETTA SOMERS.

## 2021-11-09 ENCOUNTER — OFFICE VISIT (OUTPATIENT)
Dept: NEUROSURGERY | Facility: CLINIC | Age: 56
End: 2021-11-09

## 2021-11-09 VITALS
HEIGHT: 64 IN | WEIGHT: 186 LBS | BODY MASS INDEX: 31.76 KG/M2 | HEART RATE: 68 BPM | SYSTOLIC BLOOD PRESSURE: 130 MMHG | DIASTOLIC BLOOD PRESSURE: 60 MMHG

## 2021-11-09 DIAGNOSIS — M54.12 CERVICAL RADICULOPATHY: ICD-10-CM

## 2021-11-09 DIAGNOSIS — R20.2 NUMBNESS AND TINGLING IN LEFT HAND: ICD-10-CM

## 2021-11-09 DIAGNOSIS — R20.0 NUMBNESS AND TINGLING IN LEFT HAND: ICD-10-CM

## 2021-11-09 DIAGNOSIS — M47.812 CERVICAL SPONDYLOSIS WITHOUT MYELOPATHY: Primary | ICD-10-CM

## 2021-11-09 PROCEDURE — 99214 OFFICE O/P EST MOD 30 MIN: CPT | Performed by: NURSE PRACTITIONER

## 2021-11-09 NOTE — PROGRESS NOTES
"Chief Complaint  Neck Pain, Tingling (In left hand ), and Numbness    Subjective          Lizbeth Sheikh who is a 56 y.o. year old female who presents to [facility] NEUROLOGY & NEUROSURGERY for concerns of left hand numbness.    Pt reports shortly after her visit in October she developed constant numbness in digits 4 and 5 of the left hand. This has not gone away. Denies pain in the left arm or arm. Denies weakness in the left arm or hand. She has chronic neck pain. Has not started physical therapy due to cost. [facility]. She has been doing exercises at home, routine stretching.      Interval History 10/14/21     Lizbeth Sheikh who is a 56 y.o. year old female who presents to [facility] NEUROLOGY & NEUROSURGERY for follow up of neck pain with radiculopathy.      At patient's last visit we referred to physical therapy. She had planned to go but could not afford the copay. [facility]. Having pain primarily in the neck. She is now having concerns of daily headache, starting in the back of her head and radiating up into the temporal region. This occurs on both sides. Pain is aching, throbbing, severe at times. Effecting her sleep. She has been taking ibuprofen without much relief.        Recent Interventions: None      Review of Systems   Musculoskeletal: Positive for neck pain and neck stiffness.   Neurological: Positive for numbness.   All other systems reviewed and are negative.       Objective   Vital Signs:   /60   Pulse 68   Ht 162.6 cm (64\")   Wt 84.4 kg (186 lb)   BMI 31.93 kg/m²       Physical Exam  Vitals reviewed.   Constitutional:       Appearance: Normal appearance.   Neurological:      Mental Status: She is alert and oriented to person, place, and time.      Gait: Gait is intact.      Deep Tendon Reflexes: Strength normal.        Neurologic Exam     Mental Status   Oriented to " person, place, and time.   Level of consciousness: alert    Motor Exam   Muscle bulk: normal  Overall muscle tone: normal    Strength   Strength 5/5 throughout.     Sensory Exam   Light touch normal.   Sensory deficit distribution on left: ulnar    Gait, Coordination, and Reflexes     Gait  Gait: normal       Result Review :       Data reviewed: Radiologic studies MRI Cervical Spine in March of 2020 revealed multilevel degenerative changes without canal stenosis. Foraminal narrowing at C4/5, C5/6 and C6/7 mild to moderate.          Assessment and Plan    Diagnoses and all orders for this visit:    1. Cervical spondylosis without myelopathy (Primary)  -     MRI Cervical Spine Without Contrast; Future    2. Cervical radiculopathy  -     MRI Cervical Spine Without Contrast; Future  -     EMG & Nerve Conduction Test; Future    3. Numbness and tingling in left hand  -     MRI Cervical Spine Without Contrast; Future  -     EMG & Nerve Conduction Test; Future    Pt with chronic neck pain with multilevel foraminal narrowing. She presents to with new, persistent numbness in digits 4 and 5 on the left hand. This would not be well explained by her last MRI Cervical Spine in March of 2020. Will repeat MRI Cervical Spine. There could be consideration for an entrapment neuropathy at the elbow. Will order EMG/NCV to evaluate. She will follow up in 6 weeks.       Follow Up   Return in about 6 weeks (around 12/21/2021).  Patient was given instructions and counseling regarding her condition or for health maintenance advice.     -MRI Cervical Spine  -EMG/NCV left arm  -Follow up in 6 weeks

## 2021-11-11 ENCOUNTER — PROCEDURE VISIT (OUTPATIENT)
Dept: NEUROLOGY | Facility: CLINIC | Age: 56
End: 2021-11-11

## 2021-11-11 VITALS
HEART RATE: 69 BPM | SYSTOLIC BLOOD PRESSURE: 128 MMHG | HEIGHT: 65 IN | DIASTOLIC BLOOD PRESSURE: 68 MMHG | WEIGHT: 187 LBS | BODY MASS INDEX: 31.16 KG/M2

## 2021-11-11 DIAGNOSIS — M54.12 CERVICAL RADICULOPATHY: ICD-10-CM

## 2021-11-11 DIAGNOSIS — R20.2 NUMBNESS AND TINGLING IN LEFT HAND: ICD-10-CM

## 2021-11-11 DIAGNOSIS — G56.22 ULNAR NEUROPATHY AT ELBOW OF LEFT UPPER EXTREMITY: Primary | ICD-10-CM

## 2021-11-11 DIAGNOSIS — R20.0 NUMBNESS AND TINGLING IN LEFT HAND: ICD-10-CM

## 2021-11-11 PROCEDURE — 99202 OFFICE O/P NEW SF 15 MIN: CPT | Performed by: PSYCHIATRY & NEUROLOGY

## 2021-11-11 PROCEDURE — 95885 MUSC TST DONE W/NERV TST LIM: CPT | Performed by: PSYCHIATRY & NEUROLOGY

## 2021-11-11 PROCEDURE — 95909 NRV CNDJ TST 5-6 STUDIES: CPT | Performed by: PSYCHIATRY & NEUROLOGY

## 2021-11-11 NOTE — PROGRESS NOTES
"Chief Complaint  Numbness (LUE)    Subjective          Lizbeth Sheikh is a 56 y.o. female who presents to [facility] NEUROLOGY & NEUROSURGERY  History of Present Illness  56-year-old woman evaluated for 2 to 3-week history of left hand numbness in the distribution of the ulnar nerve.  She states that she woke up that way 1 day.  This is different from her neck pain and radicular symptoms that she has had in the past.  She is here for nerve conduction study.  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  The numbness in her hand involves the pinky and ring finger and the palm of her hand.  It is there all the time.  It does not come and go.  Objective   Vital Signs:   /68   Pulse 69   Ht 165.1 cm (65\")   Wt 84.8 kg (187 lb)   BMI 31.12 kg/m²     Physical Exam   There is no weakness of the upper extremity individual muscle testing.  Specifically there is no weakness of intrinsic hand muscles and muscles supplied by the ulnar nerve.  Sensation is decreased in the distribution of the ulnar nerve in the palm.  There is no splitting of the ring finger.  No sign is positive in the left elbow negative in the right elbow.        Assessment and Plan  Diagnoses and all orders for this visit:    1. 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  Clinically she has left ulnar neuropathy at the left elbow.  I have given her information regarding obtaining elbow splints from [employer] and she is to wear it nightly for the next 3 to 4 weeks.  I discussed with her to avoid leaning her elbows on hard surfaces and keeping it straight at all times.  Her symptoms should improve in time.  She is follow-up with neurosurgery.      2. Cervical radiculopathy  -     EMG & Nerve Conduction Test    3. Numbness and tingling in left hand  -     EMG & Nerve Conduction Test       Nerve Conduction Study:  6 nerves     EMG:  Limited    Total time spent with the " patient and coordinating patient care was 15 minutes.    Follow Up  No follow-ups on file.  Patient was given instructions and counseling regarding her condition or for health maintenance advice. Please see specific information pulled into the AVS if appropriate.

## 2021-11-11 NOTE — ASSESSMENT & PLAN NOTE
Nerve conduction and EMG study is normal.  Clinically she has left ulnar neuropathy at the left elbow.  I have given her information regarding obtaining elbow splints from [employer] and she is to wear it nightly for the next 3 to 4 weeks.  I discussed with her to avoid leaning her elbows on hard surfaces and keeping it straight at all times.  Her symptoms should improve in time.  She is follow-up with neurosurgery.

## 2021-11-15 ENCOUNTER — TELEPHONE (OUTPATIENT)
Dept: NEUROLOGY | Facility: CLINIC | Age: 56
End: 2021-11-15

## 2021-11-15 NOTE — TELEPHONE ENCOUNTER
Caller: Lizbeth Sheikh    Relationship: Self    Best call back number: [phone]    What form or medical record are you requesting: RX ORDER FOR ELBOW SPLINTS    Who is requesting this form or [facility] record [facility] you: [facility]    How would you like to receive the form or medical records (pick-up, mail, fax):   If pick-up, provide patient with address and location details    Timeframe paperwork needed: ASAP    Additional notes: PT CALLING TO STATE THAT THE [facility] IS WILLING TO MAKE THE ELBOW SPLINTS SHE NEEDS INSTEAD OF GETTING THEM OFF [employer], BUT SHE NEEDS AN ORDER FOR THEM.  PT IS ASKING FOR A CALL BACK ONCE ORDER IS COMPLETED SO SHE CAN PICK IT UP.

## 2021-11-17 NOTE — TELEPHONE ENCOUNTER
I called and notified the pt and she demonstrated understanding by restatement. I asked that she call with furhter questions or concerns and she said she would.

## 2021-11-30 ENCOUNTER — HOSPITAL ENCOUNTER (OUTPATIENT)
Dept: MRI IMAGING | Facility: HOSPITAL | Age: 56
Discharge: HOME | End: 2021-11-30
Admitting: NURSE PRACTITIONER

## 2021-11-30 DIAGNOSIS — R20.0 NUMBNESS AND TINGLING IN LEFT HAND: ICD-10-CM

## 2021-11-30 DIAGNOSIS — M54.12 CERVICAL RADICULOPATHY: ICD-10-CM

## 2021-11-30 DIAGNOSIS — M47.812 CERVICAL SPONDYLOSIS WITHOUT MYELOPATHY: ICD-10-CM

## 2021-11-30 DIAGNOSIS — R20.2 NUMBNESS AND TINGLING IN LEFT HAND: ICD-10-CM

## 2021-11-30 PROCEDURE — 72141 MRI NECK SPINE W/O DYE: CPT

## 2022-01-19 ENCOUNTER — OFFICE VISIT (OUTPATIENT)
Dept: INTERNAL MEDICINE | Facility: CLINIC | Age: 57
End: 2022-01-19

## 2022-01-19 VITALS
BODY MASS INDEX: 29.66 KG/M2 | DIASTOLIC BLOOD PRESSURE: 62 MMHG | HEIGHT: 65 IN | HEART RATE: 89 BPM | SYSTOLIC BLOOD PRESSURE: 142 MMHG | OXYGEN SATURATION: 99 % | RESPIRATION RATE: 15 BRPM | TEMPERATURE: 97 F | WEIGHT: 178 LBS

## 2022-01-19 DIAGNOSIS — J45.20 MILD INTERMITTENT ASTHMA WITHOUT COMPLICATION: ICD-10-CM

## 2022-01-19 DIAGNOSIS — Z13.29 SCREENING FOR THYROID DISORDER: ICD-10-CM

## 2022-01-19 DIAGNOSIS — R03.0 ELEVATED BLOOD PRESSURE READING WITHOUT DIAGNOSIS OF HYPERTENSION: Primary | ICD-10-CM

## 2022-01-19 DIAGNOSIS — Z13.220 SCREENING FOR CHOLESTEROL LEVEL: ICD-10-CM

## 2022-01-19 DIAGNOSIS — J30.9 ALLERGIC RHINITIS, UNSPECIFIED SEASONALITY, UNSPECIFIED TRIGGER: ICD-10-CM

## 2022-01-19 DIAGNOSIS — J01.90 ACUTE SINUSITIS, RECURRENCE NOT SPECIFIED, UNSPECIFIED LOCATION: ICD-10-CM

## 2022-01-19 PROCEDURE — 99214 OFFICE O/P EST MOD 30 MIN: CPT | Performed by: PHYSICIAN ASSISTANT

## 2022-01-19 RX ORDER — GABAPENTIN 300 MG/1
300 CAPSULE ORAL 3 TIMES DAILY
COMMUNITY

## 2022-01-19 RX ORDER — AMOXICILLIN AND CLAVULANATE POTASSIUM 875; 125 MG/1; MG/1
1 TABLET, FILM COATED ORAL 2 TIMES DAILY
Qty: 20 TABLET | Refills: 0 | Status: SHIPPED | OUTPATIENT
Start: 2022-01-19 | End: 2022-01-29

## 2022-01-19 NOTE — PROGRESS NOTES
Chief Complaint  Follow up, allergies  Subjective          Lizbeth Sheikh presents to [facility]  Allergies: Flared the past wk. Pt has been having nose bleed the past wk. She has been using a humidifier   Nose feel dry   She has been having sinus ha.   Zytrec, Singulair, Flonase   She had cough last wk. Denies wheezing, resp distress.   Admits to sore throat.   Pt did a home test which was negative.   She tried Sudafed.  Pt was in [place] 12/30/21.  Pt declined COVID test, she will quarantine at home.     Asthma: denies wheezing   Denies need for albuterol recently    BP elevation: denies cp, palpitations, dizziness  She was put on Spironolactone for acne         Past Medical History:   Diagnosis Date   • Anemia    • Asthma    • Colon abnormality    • GERD (gastroesophageal reflux disease)    • Heart murmur    • Hemorrhoids    • Hernia cerebri (HCC)    • Night sweats         Past Surgical History:   Procedure Laterality Date   • BREAST BIOPSY Right    • COLONOSCOPY  2009    FORT CALLAWAY   • HYSTERECTOMY          Current Outpatient Medications on File Prior to Visit   Medication Sig Dispense Refill   • albuterol sulfate  (90 Base) MCG/ACT inhaler Inhale 2 puffs.     • cetirizine (ZyrTEC Allergy) 10 MG tablet Zyrtec 10 mg oral tablet take 1 tablet (10 mg) by oral route once daily   Active     • Cholecalciferol 25 MCG (1000 UT) capsule Take 1,000 Units by mouth Daily.     • cycloSPORINE (Restasis) 0.05 % ophthalmic emulsion 1 drop.     • fluticasone (FLONASE) 50 MCG/ACT nasal spray 2 sprays into the nostril(s) as directed by provider Daily.     • gabapentin (NEURONTIN) 300 MG capsule Take 300 mg by mouth 3 (Three) Times a Day.     • Lactobacillus (Acidophilus) capsule Take  by mouth.     • linaclotide (Linzess) 145 MCG capsule capsule Linzess 145 mcg oral capsule take 1 capsule (145 mcg) by oral route once daily on an empty stomach at least 30 minutes before 1st  "meal of the day   Active     • omeprazole (priLOSEC) 40 MG capsule omeprazole 40 mg oral capsule,delayed release(DR/EC) take 1 capsule (40 mg) by oral route once daily before a meal   Active     • spironolactone (ALDACTONE) 25 MG tablet Take 25 mg by mouth.     • tiZANidine (ZANAFLEX) 2 MG tablet Take 1-2 tablets by mouth At Night As Needed for Muscle Spasms. 60 tablet 2     No current facility-administered medications on file prior to visit.        Allergies   Allergen Reactions   • Iodine Shortness Of Breath and Swelling       Social History     Tobacco Use   Smoking Status Former Smoker   Smokeless Tobacco Never Used          Objective   Vital Signs:   /62   Pulse 89   Temp 97 °F (36.1 °C)   Resp 15   Ht 165.1 cm (65\")   Wt 80.7 kg (178 lb)   SpO2 99%   BMI 29.62 kg/m²     Physical Exam  Vitals reviewed.   Constitutional:       Appearance: Normal appearance.   HENT:      Head: Normocephalic and atraumatic.      Nose: Nose normal.      Mouth/Throat:      Mouth: Mucous membranes are moist.   Eyes:      Extraocular Movements: Extraocular movements intact.      Conjunctiva/sclera: Conjunctivae normal.      Pupils: Pupils are equal, round, and reactive to light.   Cardiovascular:      Rate and Rhythm: Normal rate and regular rhythm.   Pulmonary:      Effort: Pulmonary effort is normal.      Breath sounds: Normal breath sounds.   Abdominal:      General: Abdomen is flat. Bowel sounds are normal.      Palpations: Abdomen is soft.   Musculoskeletal:         General: Normal range of motion.   Neurological:      General: No focal deficit present.      Mental Status: She is alert and oriented to person, place, and time.   Psychiatric:         Mood and Affect: Mood normal.        Result Review :                 Assessment and Plan    Diagnoses and all orders for this visit:    1. Elevated blood pressure reading without diagnosis of hypertension (Primary)  Assessment & Plan:  Discussed blood pressure elevation at " today's visit. Risks of blood pressure elevation include death, heart attack, stroke, kidney disease, blindness. Low salt diet, increase exercise. We will monitor at follow up and add additional medications if still elevated. To er if chest pain, palpitations, vision loss, unilateral weakness, altered mental status. Pt understands and agrees with plan.      Orders:  -     Comprehensive Metabolic Panel  -     CBC & Differential  -     TSH  -     Lipid Panel    2. Screening for cholesterol level  -     Lipid Panel    3. Screening for thyroid disorder  -     TSH    4. Allergic rhinitis, unspecified seasonality, unspecified trigger  -     Comprehensive Metabolic Panel  -     CBC & Differential  -     TSH  -     Lipid Panel    5. Mild intermittent asthma without complication  Assessment & Plan:  Asthma is improving with treatment.  The patient is experiencing no daytime asthma symptoms. She is experiencing no nighttime asthma symptoms.  Cont albuterol PRN          6. Acute sinusitis, recurrence not specified, unspecified location  Assessment & Plan:  Discussed sinus infection. Increase water intake, rest, hand hygiene. Tylenol/motrin PRN fever or pain. Cont OTC antihistamines and nasal steroid. Will start antibiotic today. Patient will let us know if the symptoms are not resolved with conservative treatment.        Other orders  -     amoxicillin-clavulanate (Augmentin) 875-125 MG per tablet; Take 1 tablet by mouth 2 (Two) Times a Day for 10 days.  Dispense: 20 tablet; Refill: 0      Follow Up   Return in about 6 weeks (around 3/2/2022).  Patient was given instructions and counseling regarding her condition or for health maintenance advice. Please see specific information pulled into the AVS if appropriate.

## 2022-01-20 PROBLEM — J01.90 ACUTE SINUSITIS: Status: RESOLVED | Noted: 2022-01-20 | Resolved: 2022-01-20

## 2022-01-20 PROBLEM — R03.0 ELEVATED BLOOD PRESSURE READING WITHOUT DIAGNOSIS OF HYPERTENSION: Status: RESOLVED | Noted: 2022-01-20 | Resolved: 2022-01-20

## 2022-01-20 PROBLEM — R03.0 ELEVATED BLOOD PRESSURE READING WITHOUT DIAGNOSIS OF HYPERTENSION: Status: ACTIVE | Noted: 2022-01-20

## 2022-01-20 PROBLEM — J01.90 ACUTE SINUSITIS: Status: ACTIVE | Noted: 2022-01-20

## 2022-01-20 NOTE — ASSESSMENT & PLAN NOTE
Asthma is improving with treatment.  The patient is experiencing no daytime asthma symptoms. She is experiencing no nighttime asthma symptoms.  Cont albuterol PRN

## 2022-01-20 NOTE — ASSESSMENT & PLAN NOTE
Discussed blood pressure elevation at today's visit. Risks of blood pressure elevation include death, heart attack, stroke, kidney disease, blindness. Low salt diet, increase exercise. We will monitor at follow up and add additional medications if still elevated. To er if chest pain, palpitations, vision loss, unilateral weakness, altered mental status. Pt understands and agrees with plan.

## 2022-01-20 NOTE — ASSESSMENT & PLAN NOTE
Discussed sinus infection. Increase water intake, rest, hand hygiene. Tylenol/motrin PRN fever or pain. Cont OTC antihistamines and nasal steroid. Will start antibiotic today. Patient will let us know if the symptoms are not resolved with conservative treatment.

## 2022-02-20 PROCEDURE — 87086 URINE CULTURE/COLONY COUNT: CPT | Performed by: PHYSICIAN ASSISTANT

## 2022-02-20 PROCEDURE — 87186 SC STD MICRODIL/AGAR DIL: CPT | Performed by: PHYSICIAN ASSISTANT

## 2022-02-20 PROCEDURE — 87088 URINE BACTERIA CULTURE: CPT | Performed by: PHYSICIAN ASSISTANT

## 2022-03-03 ENCOUNTER — OFFICE VISIT (OUTPATIENT)
Dept: INTERNAL MEDICINE | Facility: CLINIC | Age: 57
End: 2022-03-03

## 2022-03-03 VITALS
RESPIRATION RATE: 15 BRPM | HEART RATE: 86 BPM | TEMPERATURE: 97 F | BODY MASS INDEX: 29.16 KG/M2 | HEIGHT: 65 IN | SYSTOLIC BLOOD PRESSURE: 132 MMHG | DIASTOLIC BLOOD PRESSURE: 70 MMHG | OXYGEN SATURATION: 99 % | WEIGHT: 175 LBS

## 2022-03-03 DIAGNOSIS — J32.1 CHRONIC FRONTAL SINUSITIS: Primary | ICD-10-CM

## 2022-03-03 DIAGNOSIS — R03.0 ELEVATED BLOOD PRESSURE READING WITHOUT DIAGNOSIS OF HYPERTENSION: ICD-10-CM

## 2022-03-03 DIAGNOSIS — J45.20 MILD INTERMITTENT ASTHMA WITHOUT COMPLICATION: ICD-10-CM

## 2022-03-03 DIAGNOSIS — L98.9 SKIN ABNORMALITIES: ICD-10-CM

## 2022-03-03 DIAGNOSIS — J30.9 ALLERGIC RHINITIS, UNSPECIFIED SEASONALITY, UNSPECIFIED TRIGGER: ICD-10-CM

## 2022-03-03 PROCEDURE — 99214 OFFICE O/P EST MOD 30 MIN: CPT | Performed by: PHYSICIAN ASSISTANT

## 2022-03-03 NOTE — PATIENT INSTRUCTIONS
Sinusitis, Adult  Sinusitis is inflammation of your sinuses. Sinuses are hollow spaces in the bones around your face. Your sinuses are located:  · Around your eyes.  · In the middle of your forehead.  · Behind your nose.  · In your cheekbones.  Mucus normally drains out of your sinuses. When your nasal tissues become inflamed or swollen, mucus can become trapped or blocked. This allows bacteria, viruses, and fungi to grow, which leads to infection. Most infections of the sinuses are caused by a virus.  Sinusitis can develop quickly. It can last for up to 4 weeks (acute) or for more than 12 weeks (chronic). Sinusitis often develops after a cold.  What are the causes?  This condition is caused by anything that creates swelling in the sinuses or stops mucus from draining. This includes:  · Allergies.  · Asthma.  · Infection from bacteria or viruses.  · Deformities or blockages in your nose or sinuses.  · Abnormal growths in the nose (nasal polyps).  · Pollutants, such as chemicals or irritants in the air.  · Infection from fungi (rare).  What increases the risk?  You are more likely to develop this condition if you:  · Have a weak body defense system (immune system).  · Do a lot of swimming or diving.  · Overuse nasal sprays.  · Smoke.  What are the signs or symptoms?  The main symptoms of this condition are pain and a feeling of pressure around the affected sinuses. Other symptoms include:  · Stuffy nose or congestion.  · Thick drainage from your nose.  · Swelling and warmth over the affected sinuses.  · Headache.  · Upper toothache.  · A cough that may get worse at night.  · Extra mucus that collects in the throat or the back of the nose (postnasal drip).  · Decreased sense of smell and taste.  · Fatigue.  · A fever.  · Sore throat.  · Bad breath.  How is this diagnosed?  This condition is diagnosed based on:  · Your symptoms.  · Your medical history.  · A physical exam.  · Tests to find out if your condition is  acute or chronic. This may include:  ? Checking your nose for nasal polyps.  ? Viewing your sinuses using a device that has a light (endoscope).  ? Testing for allergies or bacteria.  ? Imaging tests, such as an MRI or CT scan.  In rare cases, a bone biopsy may be done to rule out more serious types of fungal sinus disease.  How is this treated?  Treatment for sinusitis depends on the cause and whether your condition is chronic or acute.  · If caused by a virus, your symptoms should go away on their own within 10 days. You may be given medicines to relieve symptoms. They include:  ? Medicines that shrink swollen nasal passages (topical intranasal decongestants).  ? Medicines that treat allergies (antihistamines).  ? A spray that eases inflammation of the nostrils (topical intranasal corticosteroids).  ? Rinses that help get rid of thick mucus in your nose (nasal saline washes).  · If caused by bacteria, your health care provider may recommend waiting to see if your symptoms improve. Most bacterial infections will get better without antibiotic medicine. You may be given antibiotics if you have:  ? A severe infection.  ? A weak immune system.  · If caused by narrow nasal passages or nasal polyps, you may need to have surgery.  Follow these instructions at home:  Medicines  · Take, use, or apply over-the-counter and prescription medicines only as told by your health care provider. These may include nasal sprays.  · If you were prescribed an antibiotic medicine, take it as told by your health care provider. Do not stop taking the antibiotic even if you start to feel better.  Hydrate and humidify    · Drink enough fluid to keep your urine pale yellow. Staying hydrated will help to thin your mucus.  · Use a cool mist humidifier to keep the humidity level in your home above 50%.  · Inhale steam for 10-15 minutes, 3-4 times a day, or as told by your health care provider. You can do this in the bathroom while a hot shower is  running.  · Limit your exposure to cool or dry air.    Rest  · Rest as much as possible.  · Sleep with your head raised (elevated).  · Make sure you get enough sleep each night.  General instructions    · Apply a warm, moist washcloth to your face 3-4 times a day or as told by your health care provider. This will help with discomfort.  · Wash your hands often with soap and water to reduce your exposure to germs. If soap and water are not available, use hand .  · Do not smoke. Avoid being around people who are smoking (secondhand smoke).  · Keep all follow-up visits as told by your health care provider. This is important.    Contact a health care provider if:  · You have a fever.  · Your symptoms get worse.  · Your symptoms do not improve within 10 days.  Get help right away if:  · You have a severe headache.  · You have persistent vomiting.  · You have severe pain or swelling around your face or eyes.  · You have vision problems.  · You develop confusion.  · Your neck is stiff.  · You have trouble breathing.  Summary  · Sinusitis is soreness and inflammation of your sinuses. Sinuses are hollow spaces in the bones around your face.  · This condition is caused by nasal tissues that become inflamed or swollen. The swelling traps or blocks the flow of mucus. This allows bacteria, viruses, and fungi to grow, which leads to infection.  · If you were prescribed an antibiotic medicine, take it as told by your health care provider. Do not stop taking the antibiotic even if you start to feel better.  · Keep all follow-up visits as told by your health care provider. This is important.  This information is not intended to replace advice given to you by your health care provider. Make sure you discuss any questions you have with your health care provider.  Document Revised: 05/20/2019 Document Reviewed: 05/20/2019  [employer] Patient Education © 2021 [employer]

## 2022-03-03 NOTE — PROGRESS NOTES
Chief Complaint  Allergies, Eczema, and Headache (sinus headaches)    Subjective          Lizbeth Sheikh presents to [facility]  Allergies: She has been taking Zyrtec, flonase, and singulair   She is getting allergy injections, just increased her dose.   Allergy recently increased Zyrtec.   Frontal sinus pressure. She finished abx in 1/2022 and had no improvement in symptoms. [facility]  She has never seen ENT.   She states if she takes sudafed it improves.   Denies vision loss.   Denies fever.   Denies cough.  Pt had septum repaired in July 2021, but noticed no improvement.     Asthma: denies wheezing, resp distress  Denies needing albuterol recently.     BP elevation: denies chest pain, palpitations.   Admits to taking Sudafed recently.     Past Medical History:   Diagnosis Date   • Anemia    • Asthma    • Colon abnormality    • GERD (gastroesophageal reflux disease)    • Heart murmur    • Hemorrhoids    • Hernia cerebri (HCC)    • Night sweats         Past Surgical History:   Procedure Laterality Date   • BREAST BIOPSY Right    • COLONOSCOPY  2009 [place]   • HYSTERECTOMY          Current Outpatient Medications on File Prior to Visit   Medication Sig Dispense Refill   • albuterol sulfate  (90 Base) MCG/ACT inhaler Inhale 2 puffs.     • cetirizine (ZyrTEC Allergy) 10 MG tablet Zyrtec 10 mg oral tablet take 1 tablet (10 mg) by oral route once daily   Active     • Cholecalciferol 25 MCG (1000 UT) capsule Take 1,000 Units by mouth Daily.     • cycloSPORINE (Restasis) 0.05 % ophthalmic emulsion 1 drop.     • fluticasone (FLONASE) 50 MCG/ACT nasal spray 2 sprays into the nostril(s) as directed by provider Daily.     • gabapentin (NEURONTIN) 300 MG capsule Take 300 mg by mouth 3 (Three) Times a Day.     • Lactobacillus (Acidophilus) capsule Take  by mouth.     • linaclotide (Linzess) 145 MCG capsule capsule Linzess 145 mcg oral capsule take 1  "capsule (145 mcg) by oral route once daily on an empty stomach at least 30 minutes before 1st meal of the day   Active     • omeprazole (priLOSEC) 40 MG capsule omeprazole 40 mg oral capsule,delayed release(DR/EC) take 1 capsule (40 mg) by oral route once daily before a meal   Active     • spironolactone (ALDACTONE) 25 MG tablet Take 25 mg by mouth.     • tiZANidine (ZANAFLEX) 2 MG tablet Take 1-2 tablets by mouth At Night As Needed for Muscle Spasms. 60 tablet 2     No current facility-administered medications on file prior to visit.        Allergies   Allergen Reactions   • Iodine Shortness Of Breath and Swelling       Social History     Tobacco Use   Smoking Status Never Smoker   Smokeless Tobacco Never Used          Objective   Vital Signs:   /70   Pulse 86   Temp 97 °F (36.1 °C)   Resp 15   Ht 165.1 cm (65\")   Wt 79.4 kg (175 lb)   SpO2 99%   BMI 29.12 kg/m²     Physical Exam  Vitals reviewed.   Constitutional:       Appearance: Normal appearance.   HENT:      Head: Normocephalic and atraumatic.      Comments: Frontal sinus pressure.     Nose: Nose normal.      Mouth/Throat:      Mouth: Mucous membranes are moist.   Eyes:      Extraocular Movements: Extraocular movements intact.      Conjunctiva/sclera: Conjunctivae normal.      Pupils: Pupils are equal, round, and reactive to light.   Cardiovascular:      Rate and Rhythm: Normal rate and regular rhythm.   Pulmonary:      Effort: Pulmonary effort is normal.      Breath sounds: Normal breath sounds.   Abdominal:      General: Abdomen is flat. Bowel sounds are normal.      Palpations: Abdomen is soft.   Musculoskeletal:         General: Normal range of motion.   Neurological:      General: No focal deficit present.      Mental Status: She is alert and oriented to person, place, and time.   Psychiatric:         Mood and Affect: Mood normal.        Result Review :                 Assessment and Plan    Diagnoses and all orders for this visit:    1. " Chronic frontal sinusitis (Primary)  Comments:  Discussed continued sinus sx. Cont allergy injections. Will get sinus CT to eval and refer to ENT if needed based on result.  Orders:  -     CT Sinus Without Contrast; Future    2. Mild intermittent asthma without complication  Assessment & Plan:  Asthma is unchanged.  The patient is experiencing no daytime asthma symptoms. She is experiencing no nighttime asthma symptoms.  Cont current meds           3. Elevated blood pressure reading without diagnosis of hypertension  Assessment & Plan:  Discussed bp elevation at today's visit. Not high enough for medication at this time. Discussed risks of blood pressure elevation including death, heart attack, stroke, kidney disease, blindness. Low salt diet, increase exercise. We will monitor at follow up and discuss blood pressure medications if necessary. To er if chest pain, palpitations, vision loss, unilateral weakness, altered mental status. Pt understands and agrees with plan.        4. Allergic rhinitis, unspecified seasonality, unspecified trigger  Comments:  Cont follow up with allergist.       Follow Up   Return in about 3 months (around 6/3/2022).  Patient was given instructions and counseling regarding her condition or for health maintenance advice. Please see specific information pulled into the AVS if appropriate.

## 2022-03-04 NOTE — ASSESSMENT & PLAN NOTE
Discussed bp elevation at today's visit. Not high enough for medication at this time. Discussed risks of blood pressure elevation including death, heart attack, stroke, kidney disease, blindness. Low salt diet, increase exercise. We will monitor at follow up and discuss blood pressure medications if necessary. To er if chest pain, palpitations, vision loss, unilateral weakness, altered mental status. Pt understands and agrees with plan.

## 2022-03-04 NOTE — ASSESSMENT & PLAN NOTE
Asthma is unchanged.  The patient is experiencing no daytime asthma symptoms. She is experiencing no nighttime asthma symptoms.  Cont current meds

## 2022-03-15 ENCOUNTER — TELEPHONE (OUTPATIENT)
Dept: INTERNAL MEDICINE | Facility: CLINIC | Age: 57
End: 2022-03-15

## 2022-03-15 DIAGNOSIS — J32.1 CHRONIC FRONTAL SINUSITIS: Primary | ICD-10-CM

## 2022-03-15 NOTE — TELEPHONE ENCOUNTER
[facility] CALLED STATING THAT PATIENT WAS ON THE LINE AND SHE HAD SPOKE TO SOMEONE IN [place] OFFICE AND WAS TOLD THAT [place] WOULD NOT PLACE A REFERRAL FOR ENT THAT DR URENA HAD TO DO THIS.    PATIENT HAS AN UPCOMING APPT WITH DR URENA BUT HER CARE IS UNDER [facility] SHELL UNTIL THAT TIME.  SHE IS HER PCP AND IS NOT A CURRENT PATIENT OF DR GUTIERREZ'    PATIENT HUNG UP ON HUB CALL BEFORE CALL WAS TRANSFERRED.    [facility] OKAY TO ADVISE      .

## 2022-03-15 NOTE — TELEPHONE ENCOUNTER
Caller: Lizbeth Sheikh    Relationship: Self    Best call back number: [phone]    What is the medical concern/diagnosis: HEADACHES AND CONGESTION     What specialty or service is being requested: ENT    What is the provider, practice or medical service name: DR FUENTES    What is the office phone number: [phone]

## 2022-03-18 ENCOUNTER — TELEPHONE (OUTPATIENT)
Dept: INTERNAL MEDICINE | Facility: CLINIC | Age: 57
End: 2022-03-18

## 2022-03-18 NOTE — TELEPHONE ENCOUNTER
Red rule verified and correct.    They are trying to find out about the ENT referral.  It had been placed, but now says cancelled.    Advised her to call the ENT office to see if they would accept it, if not a new one would need to be done by Angeles if before 3/24/22 or Dr Glover if on or after 3/24/22.

## 2022-03-18 NOTE — TELEPHONE ENCOUNTER
PT(PATIENT) VERIFIED     PT(PATIENT) STATES SHE SEES DR GLOVER    LAST APPT WITH DR GLOVER    Office Visit Converted with Matt Glover Jr., MD (2020)    PT(PATIENT) HAS SINCE SEEN ANGELES [facility]    PT(PATIENT) STATES SHE WANTS TO SEE DR GLOVER AND NOT [facility]    PT(PATIENT) HAS AN UPCOMING APPT WITH DR GLOVER    Appointment with Matt Glover Jr., MD (2022)    PT(PATIENT) STATES [facility] TOLD HER SHE IS NOT HER PCP, SO SHE CANNOT DO HER REFERRALS    PT(PATIENT) IS CALLING TO REQUEST AN UPDATE ON HER REFERRALS    Referral to Otolaryngology for Chronic frontal sinusitis (03/15/2022)  Referral to Dermatology for Skin abnormalities (2022)    PT(PATIENT) ADVISED THAT [facility] IS LISTED AS PCP    PT(PATIENT) ADVISED OUR PROVIDER WOULD BE UNABLE TO PROVIDE MEDICAL CARE UNTIL SEEN IN OFFICE FOR PCP APPOINTMENT    AT THAT TIME, OFFICE WOULD BE ABLE TO PLACE REFERRAL WITH OUR OFFICE AND FOLLOW UP ON CONTINUATION OF CARE    PER REFERRAL # [id number] STATES M TO INFORM PT(PATIENT) AYANNA IS PCP, HOWEVER AGAIN AYANNA HAS NOT SEEN PT(PATIENT) SINCE 3/2020    PLEASE REFER TO THE FOLLOWING ENCOUNTER  Telephone with Angeles Brand PA-C (03/15/2022)    PT(PATIENT) REQUESTED TO MAKE A SOONER APPT WITH ANY PROVIDER IN OFFICE    PRACTICE MANAGER VERBALLY INFORMED OF THE ABOVE

## 2022-03-24 ENCOUNTER — TELEPHONE (OUTPATIENT)
Dept: INTERNAL MEDICINE | Facility: CLINIC | Age: 57
End: 2022-03-24

## 2022-03-24 ENCOUNTER — OFFICE VISIT (OUTPATIENT)
Dept: INTERNAL MEDICINE | Facility: CLINIC | Age: 57
End: 2022-03-24

## 2022-03-24 VITALS
SYSTOLIC BLOOD PRESSURE: 151 MMHG | TEMPERATURE: 97.7 F | DIASTOLIC BLOOD PRESSURE: 72 MMHG | HEART RATE: 84 BPM | OXYGEN SATURATION: 98 % | BODY MASS INDEX: 29.22 KG/M2 | WEIGHT: 175.38 LBS | HEIGHT: 65 IN

## 2022-03-24 DIAGNOSIS — Z00.00 ENCOUNTER FOR MEDICAL EXAMINATION TO ESTABLISH CARE: ICD-10-CM

## 2022-03-24 DIAGNOSIS — J32.9 RECURRENT SINUSITIS: Primary | ICD-10-CM

## 2022-03-24 PROBLEM — K46.9 ABDOMINAL HERNIA: Status: ACTIVE | Noted: 2022-03-24

## 2022-03-24 PROBLEM — Z80.3 FAMILY HISTORY OF BREAST CANCER: Status: ACTIVE | Noted: 2021-05-04

## 2022-03-24 PROBLEM — D64.9 ANEMIA: Status: ACTIVE | Noted: 2022-03-24

## 2022-03-24 PROBLEM — K64.9 HEMORRHOIDS: Status: ACTIVE | Noted: 2022-03-24

## 2022-03-24 PROBLEM — J45.909 ASTHMA: Status: ACTIVE | Noted: 2022-03-24

## 2022-03-24 PROBLEM — R01.1 HEART MURMUR: Status: ACTIVE | Noted: 2022-03-24

## 2022-03-24 PROBLEM — R61 NIGHT SWEATS: Status: ACTIVE | Noted: 2022-03-24

## 2022-03-24 PROCEDURE — 99213 OFFICE O/P EST LOW 20 MIN: CPT | Performed by: NURSE PRACTITIONER

## 2022-03-24 NOTE — TELEPHONE ENCOUNTER
Lizbeth stated that she is in need of an updated letter to allow her to work from home due to allergies and asthma. She states that Kristine gave her one this time last year.

## 2022-03-24 NOTE — PROGRESS NOTES
Chief Complaint  Sinus Problem (Wanting referral to ENT. Since having sinus surgery she has had headaches and congestion. Angeles Brand PA-C wouldn't give her a referral because she wasn't the PCP but the patient had been seeing Angeles for 2 years. )    Subjective          Lizbeth Sheikh presents to [facility]  Sinusitis  This is a recurrent problem. Episode onset: [facility] [place] Has not had follow-up since. Requesting ENT referral for persistent sinus pressure and sinus headaches.  Associated symptoms include headaches and sinus pressure. Pertinent negatives include no chills, congestion, coughing, diaphoresis, ear pain, hoarse voice, neck pain, shortness of breath, sore throat or swollen glands.       Current Outpatient Medications   Medication Instructions   • albuterol sulfate  (90 Base) MCG/ACT inhaler 2 puffs, Inhalation   • cetirizine (zyrTEC) 10 MG tablet Zyrtec 10 mg oral tablet take 1 tablet (10 mg) by oral route once daily   Active   • Cholecalciferol 1,000 Units, Oral, Daily   • cycloSPORINE (Restasis) 0.05 % ophthalmic emulsion 1 drop   • fluticasone (FLONASE) 50 MCG/ACT nasal spray 2 sprays, Nasal, Daily   • gabapentin (NEURONTIN) 300 mg, Oral, 3 Times Daily   • Lactobacillus (Acidophilus) capsule Oral   • linaclotide (LINZESS) 145 MCG capsule capsule Linzess 145 mcg oral capsule take 1 capsule (145 mcg) by oral route once daily on an empty stomach at least 30 minutes before 1st meal of the day   Active   • omeprazole (priLOSEC) 40 MG capsule omeprazole 40 mg oral capsule,delayed release(DR/EC) take 1 capsule (40 mg) by oral route once daily before a meal   Active   • spironolactone (ALDACTONE) 25 mg, Oral   • tiZANidine (ZANAFLEX) 2-4 mg, Oral, Nightly PRN       The following portions of the patient's history were reviewed and updated as appropriate: allergies, current medications, past family history, past medical  "history, past social history, past surgical history, and problem list.    Objective   Vital Signs:   /72 (BP Location: Left arm, Patient Position: Sitting, Cuff Size: Large Adult)   Pulse 84   Temp 97.7 °F (36.5 °C) (Temporal)   Ht 165.1 cm (65\")   Wt 79.5 kg (175 lb 6 oz)   SpO2 98%   BMI 29.18 kg/m²     Wt Readings from Last 3 Encounters:   03/24/22 79.5 kg (175 lb 6 oz)   03/03/22 79.4 kg (175 lb)   02/20/22 80.7 kg (178 lb)     BP Readings from Last 3 Encounters:   03/24/22 151/72   03/03/22 132/70   02/20/22 128/63     Physical Exam  Vitals and nursing note reviewed.   Constitutional:       General: She is not in acute distress.     Appearance: Normal appearance. She is not ill-appearing.   HENT:      Nose:      Right Sinus: Maxillary sinus tenderness present.      Left Sinus: Maxillary sinus tenderness present.      Mouth/Throat:      Mouth: Mucous membranes are moist.   Eyes:      Extraocular Movements: Extraocular movements intact.      Conjunctiva/sclera: Conjunctivae normal.   Cardiovascular:      Rate and Rhythm: Normal rate.   Pulmonary:      Effort: Pulmonary effort is normal.      Breath sounds: Normal breath sounds.   Skin:     General: Skin is warm and dry.      Capillary Refill: Capillary refill takes less than 2 seconds.   Neurological:      General: No focal deficit present.      Mental Status: She is alert and oriented to person, place, and time. Mental status is at baseline.   Psychiatric:         Mood and Affect: Mood normal.         Behavior: Behavior normal.         Thought Content: Thought content normal.         Judgment: Judgment normal.              Result Review :   The following data was reviewed by: DEMARCUS Porter on 03/24/2022:           Lab Results   Component Value Date    INR 0.94 (L) 03/04/2021    BILIRUBINUR Negative 02/20/2022       Procedures        Assessment and Plan    Diagnoses and all orders for this visit:    1. Recurrent sinusitis (Primary)  -     " Ambulatory Referral to ENT (Otolaryngology)    2. Encounter for medical examination to establish care          There are no discontinued medications.       Follow Up   Return in about 4 weeks (around 4/21/2022) for Annual physical; labs .  Patient was given instructions and counseling regarding her condition or for health maintenance advice. Please see specific information pulled into the AVS if appropriate.       DEMARCUS Porter  03/24/22  14:49 EDT

## 2022-03-29 ENCOUNTER — APPOINTMENT (OUTPATIENT)
Dept: CT IMAGING | Facility: HOSPITAL | Age: 57
End: 2022-03-29

## 2022-03-29 NOTE — TELEPHONE ENCOUNTER
Contacted patient in regards to letter. Sent via [employer] per patient request. No further questions or concerns noted.

## 2022-03-29 NOTE — TELEPHONE ENCOUNTER
It looks like Angeles did the note 7/19/21.   Will someone copy that with my name and get it to the patient.     Thanks!

## 2022-04-05 ENCOUNTER — PATIENT MESSAGE (OUTPATIENT)
Dept: INTERNAL MEDICINE | Facility: CLINIC | Age: 57
End: 2022-04-05

## 2022-04-08 ENCOUNTER — TELEPHONE (OUTPATIENT)
Dept: INTERNAL MEDICINE | Facility: CLINIC | Age: 57
End: 2022-04-08

## 2022-04-08 NOTE — TELEPHONE ENCOUNTER
Caller: Lizbeth Sheikh    Relationship: Self    Best call back number: [phone]    What form or medical record are you requesting: ADDITIONAL STATEMENT NEEDS TO BE ADDED TO PREVIOUS COMMUNICATION ON MY CHART:   The patient has medical conditions that place them at high risk for complications from COVID 19 if they became ill from COVID.        Who is requesting this form or medical record from you: EMPLOYER       Timeframe paperwork needed: AS SOON AS POSSIBLE , REQUEST WAS MADE ON MY CHART 04/05/2022

## 2022-04-12 NOTE — TELEPHONE ENCOUNTER
Looks like the letter was already made up. Contacted the patient she is aware and already printed it out

## 2022-04-14 ENCOUNTER — OFFICE VISIT (OUTPATIENT)
Dept: INTERNAL MEDICINE | Facility: CLINIC | Age: 57
End: 2022-04-14

## 2022-04-14 VITALS
WEIGHT: 173.25 LBS | HEART RATE: 75 BPM | SYSTOLIC BLOOD PRESSURE: 118 MMHG | DIASTOLIC BLOOD PRESSURE: 77 MMHG | TEMPERATURE: 97.7 F | HEIGHT: 65 IN | OXYGEN SATURATION: 98 % | BODY MASS INDEX: 28.86 KG/M2

## 2022-04-14 DIAGNOSIS — J01.01 ACUTE RECURRENT MAXILLARY SINUSITIS: Primary | Chronic | ICD-10-CM

## 2022-04-14 DIAGNOSIS — L25.9 CONTACT DERMATITIS, UNSPECIFIED CONTACT DERMATITIS TYPE, UNSPECIFIED TRIGGER: ICD-10-CM

## 2022-04-14 DIAGNOSIS — L91.0 KELOID SCAR: Chronic | ICD-10-CM

## 2022-04-14 DIAGNOSIS — L50.9 URTICARIA: ICD-10-CM

## 2022-04-14 PROCEDURE — 96372 THER/PROPH/DIAG INJ SC/IM: CPT | Performed by: NURSE PRACTITIONER

## 2022-04-14 PROCEDURE — 99214 OFFICE O/P EST MOD 30 MIN: CPT | Performed by: NURSE PRACTITIONER

## 2022-04-14 RX ORDER — METHYLPREDNISOLONE 4 MG/1
TABLET ORAL
Qty: 21 EACH | Refills: 0 | Status: SHIPPED | OUTPATIENT
Start: 2022-04-14 | End: 2022-04-14

## 2022-04-14 RX ORDER — AMOXICILLIN AND CLAVULANATE POTASSIUM 875; 125 MG/1; MG/1
1 TABLET, FILM COATED ORAL 2 TIMES DAILY
Qty: 20 TABLET | Refills: 0 | Status: SHIPPED | OUTPATIENT
Start: 2022-04-14 | End: 2022-04-24

## 2022-04-14 RX ORDER — METHYLPREDNISOLONE ACETATE 80 MG/ML
80 INJECTION, SUSPENSION INTRA-ARTICULAR; INTRALESIONAL; INTRAMUSCULAR; SOFT TISSUE ONCE
Status: COMPLETED | OUTPATIENT
Start: 2022-04-14 | End: 2022-04-14

## 2022-04-14 RX ADMIN — METHYLPREDNISOLONE ACETATE 80 MG: 80 INJECTION, SUSPENSION INTRA-ARTICULAR; INTRALESIONAL; INTRAMUSCULAR; SOFT TISSUE at 11:57

## 2022-04-14 NOTE — PROGRESS NOTES
"Chief Complaint  Sinus Problem (Has appt for 5/12 with ENT, is about to travel via air and is wanting something to help with headaches and congestion until then.)    Joseph Sheikh presents to [facility]  Patient suffers from recurrent sinusitis. Patient states that she has an ENT appt scheduled but is about to fly to [place]. Patient states 2 weeks ago during her last flight she had issues with significant sinus pressure and \"felt like my face was going to pop\".   Requesting medications in case she needs them while she is gone.   Also reports contact dermatitis to right arm, unknown trigger. Denies SOA(SHORTNESS OF AIR), cough, swelling.     Also requesting derm referral for keloids.     Sinusitis  This is a recurrent problem. The current episode started in the past 7 days. The problem is unchanged. There has been no fever. Associated symptoms include congestion and sinus pressure. Pertinent negatives include no chills, coughing, diaphoresis, ear pain, headaches, hoarse voice, neck pain, shortness of breath, sneezing, sore throat or swollen glands.         Current Outpatient Medications   Medication Instructions   • albuterol sulfate  (90 Base) MCG/ACT inhaler 2 puffs, Inhalation   • amoxicillin-clavulanate (AUGMENTIN) 875-125 MG per tablet 1 tablet, Oral, 2 Times Daily   • cetirizine (zyrTEC) 10 MG tablet Zyrtec 10 mg oral tablet take 1 tablet (10 mg) by oral route once daily   Active   • Cholecalciferol 1,000 Units, Oral, Daily   • cycloSPORINE (Restasis) 0.05 % ophthalmic emulsion 1 drop   • fluticasone (FLONASE) 50 MCG/ACT nasal spray 2 sprays, Nasal, Daily   • gabapentin (NEURONTIN) 300 mg, Oral, 3 Times Daily   • Lactobacillus (Acidophilus) capsule Oral   • linaclotide (LINZESS) 145 MCG capsule capsule Linzess 145 mcg oral capsule take 1 capsule (145 mcg) by oral route once daily on an empty stomach at least 30 minutes before 1st meal " "of the day   Active   • omeprazole (priLOSEC) 40 MG capsule omeprazole 40 mg oral capsule,delayed release(DR/EC) take 1 capsule (40 mg) by oral route once daily before a meal   Active   • spironolactone (ALDACTONE) 25 mg, Oral   • tiZANidine (ZANAFLEX) 2-4 mg, Oral, Nightly PRN       The following portions of the patient's history were reviewed and updated as appropriate: allergies, current medications, past family history, past medical history, past social history, past surgical history, and problem list.    Objective   Vital Signs:   /77   Pulse 75   Temp 97.7 °F (36.5 °C) (Temporal)   Ht 165.1 cm (65\")   Wt 78.6 kg (173 lb 4 oz)   SpO2 98%   BMI 28.83 kg/m²     Wt Readings from Last 3 Encounters:   04/14/22 78.6 kg (173 lb 4 oz)   03/24/22 79.5 kg (175 lb 6 oz)   03/03/22 79.4 kg (175 lb)     BP Readings from Last 3 Encounters:   04/14/22 118/77   03/24/22 151/72   03/03/22 132/70     Physical Exam  Vitals and nursing note reviewed.   Constitutional:       General: She is not in acute distress.     Appearance: Normal appearance. She is not ill-appearing.   HENT:      Right Ear: Tympanic membrane, ear canal and external ear normal.      Left Ear: Tympanic membrane, ear canal and external ear normal.      Nose: Congestion present.      Right Sinus: Maxillary sinus tenderness present.      Left Sinus: Maxillary sinus tenderness present.   Eyes:      Extraocular Movements: Extraocular movements intact.      Conjunctiva/sclera: Conjunctivae normal.   Cardiovascular:      Rate and Rhythm: Normal rate.   Pulmonary:      Effort: Pulmonary effort is normal.      Breath sounds: Normal breath sounds.   Skin:     General: Skin is warm.      Capillary Refill: Capillary refill takes less than 2 seconds.      Findings: Rash (right upper arm ) present.             Comments: Keloid noted to left side of forehead    Neurological:      General: No focal deficit present.      Mental Status: She is alert and oriented to " person, place, and time. Mental status is at baseline.   Psychiatric:         Mood and Affect: Mood normal.         Behavior: Behavior normal.         Thought Content: Thought content normal.         Judgment: Judgment normal.              Result Review :{Labs  Result Review  Imaging  Med Tab  Media  Procedures :23}   The following data was reviewed by: DEMARCUS Porter on 04/14/2022:           Lab Results   Component Value Date    INR 0.94 (L) 03/04/2021    BILIRUBINUR Negative 02/20/2022       Procedures        Assessment and Plan    Diagnoses and all orders for this visit:    1. Acute recurrent maxillary sinusitis (Primary)  -     amoxicillin-clavulanate (AUGMENTIN) 875-125 MG per tablet; Take 1 tablet by mouth 2 (Two) Times a Day for 10 days.  Dispense: 20 tablet; Refill: 0  -     Discontinue: methylPREDNISolone (Medrol) 4 MG dose pack; Take as directed on package instructions.  Dispense: 21 each; Refill: 0    2. Keloid scar  -     Ambulatory Referral to Dermatology    3. Urticaria  -     methylPREDNISolone acetate (DEPO-medrol) injection 80 mg    4. Contact dermatitis, unspecified contact dermatitis type, unspecified trigger          Medications Discontinued During This Encounter   Medication Reason   • methylPREDNISolone (Medrol) 4 MG dose pack Historical Med - Therapy completed          Follow Up   Return in 4 weeks (on 5/12/2022), or if symptoms worsen or fail to improve, for Annual physical.  Patient was given instructions and counseling regarding her condition or for health maintenance advice. Please see specific information pulled into the AVS if appropriate.       DEMARCUS Porter  04/14/22  12:10 EDT            Answers for HPI/ROS submitted by the patient on 4/13/2022  Please describe your symptoms.: Sinus infection  Have you had these symptoms before?: Yes  How long have you been having these symptoms?: Greater than 2 weeks  What is the primary reason for your visit?: Other

## 2022-04-19 ENCOUNTER — HOSPITAL ENCOUNTER (OUTPATIENT)
Dept: CT IMAGING | Facility: HOSPITAL | Age: 57
Discharge: HOME | End: 2022-04-19
Admitting: PHYSICIAN ASSISTANT

## 2022-04-19 DIAGNOSIS — J32.1 CHRONIC FRONTAL SINUSITIS: ICD-10-CM

## 2022-04-19 PROCEDURE — 70486 CT MAXILLOFACIAL W/O DYE: CPT

## 2022-05-11 ENCOUNTER — OFFICE VISIT (OUTPATIENT)
Dept: OTOLARYNGOLOGY | Facility: CLINIC | Age: 57
End: 2022-05-11

## 2022-05-11 VITALS — WEIGHT: 169 LBS | TEMPERATURE: 97.5 F | HEIGHT: 65 IN | BODY MASS INDEX: 28.16 KG/M2

## 2022-05-11 DIAGNOSIS — J34.3 HYPERTROPHY OF BOTH INFERIOR NASAL TURBINATES: ICD-10-CM

## 2022-05-11 DIAGNOSIS — R51.9 HEADACHE DISORDER: ICD-10-CM

## 2022-05-11 DIAGNOSIS — J34.2 NASAL SEPTAL DEVIATION: ICD-10-CM

## 2022-05-11 DIAGNOSIS — J30.9 ALLERGIC RHINITIS, UNSPECIFIED SEASONALITY, UNSPECIFIED TRIGGER: Primary | ICD-10-CM

## 2022-05-11 PROCEDURE — 99203 OFFICE O/P NEW LOW 30 MIN: CPT | Performed by: OTOLARYNGOLOGY

## 2022-05-11 RX ORDER — AMOXICILLIN AND CLAVULANATE POTASSIUM 875; 125 MG/1; MG/1
TABLET, FILM COATED ORAL
COMMUNITY
Start: 2022-05-06 | End: 2022-10-05

## 2022-05-11 RX ORDER — METHYLPREDNISOLONE 4 MG/1
TABLET ORAL
COMMUNITY
Start: 2022-05-06 | End: 2022-10-05

## 2022-05-11 NOTE — PROGRESS NOTES
"Patient Name: Lizbeth Sheikh   Visit Date: 05/11/2022   Patient ID: [id number]  Provider: Jaiden Fernández MD    Sex: female  Location: [facility]   YOB: 1965  Location Address: [address] [place] [address], [address] [place] [address],?[place]?[id number]    Primary Care Provider Kristine Santiago APRN  Location Phone: [phone]    Referring Provider: REJI Porter        Chief Complaint  Sinusitis    History of Present Illness  Lizbeth Sheikh is a 57 y.o. female who presents to [facility] today as a consult from REJI Porter for evaluation of her sinuses.  She tells me that she has had issues with \"constant sinus infections\".  Her typical infection involves headache, nasal congestion, earaches, occasional nausea, and occasional photophobia.  They typically last 7 to 10 days in duration and will often resolve without intervention.  She has not had any issues with rhinorrhea but does report a postnasal drainage.  She denies any hyposmia, cough, fevers, or chills.  She has tried Sudafed, saline irrigations, Flonase, and antibiotics without significant improvement.  She is currently on weekly immunotherapy.  She underwent endoscopic bilateral maxillary antrostomies and partial anterior ethmoidectomies at the [facility] in [place] in July 2021.  She does not feel like she has done any better since.  She denies any bruxism. CT scan of the sinuses without contrast on 4/19/2022 revealed left greater than right maxillary sinus mucous retention cysts, anterior septal deviation, and evidence of previous maxillary antrostomies and partial anterior ethmoidectomies.      Past Medical History:   Diagnosis Date   • Anemia    • Asthma    • Colon abnormality    • GERD (gastroesophageal reflux disease)    • Heart murmur    • Hemorrhoids    • Hernia cerebri (HCC)    • Night sweats        Past Surgical History:   Procedure Laterality Date   • BREAST BIOPSY " Right    • COLONOSCOPY  2009    FORT CALLAWAY   • HYSTERECTOMY           Current Outpatient Medications:   •  albuterol sulfate  (90 Base) MCG/ACT inhaler, Inhale 2 puffs., Disp: , Rfl:   •  amoxicillin-clavulanate (AUGMENTIN) 875-125 MG per tablet, , Disp: , Rfl:   •  cetirizine (zyrTEC) 10 MG tablet, Zyrtec 10 mg oral tablet take 1 tablet (10 mg) by oral route once daily   Active, Disp: , Rfl:   •  Cholecalciferol 25 MCG (1000 UT) capsule, Take 1,000 Units by mouth Daily., Disp: , Rfl:   •  cycloSPORINE (Restasis) 0.05 % ophthalmic emulsion, 1 drop., Disp: , Rfl:   •  fluticasone (FLONASE) 50 MCG/ACT nasal spray, 2 sprays into the nostril(s) as directed by provider Daily., Disp: , Rfl:   •  gabapentin (NEURONTIN) 300 MG capsule, Take 300 mg by mouth 3 (Three) Times a Day., Disp: , Rfl:   •  Lactobacillus (Acidophilus) capsule, Take  by mouth., Disp: , Rfl:   •  linaclotide (LINZESS) 145 MCG capsule capsule, Linzess 145 mcg oral capsule take 1 capsule (145 mcg) by oral route once daily on an empty stomach at least 30 minutes before 1st meal of the day   Active, Disp: , Rfl:   •  methylPREDNISolone (MEDROL) 4 MG dose pack, , Disp: , Rfl:   •  omeprazole (priLOSEC) 40 MG capsule, omeprazole 40 mg oral capsule,delayed release(DR/EC) take 1 capsule (40 mg) by oral route once daily before a meal   Active, Disp: , Rfl:   •  spironolactone (ALDACTONE) 25 MG tablet, Take 25 mg by mouth., Disp: , Rfl:   •  tiZANidine (ZANAFLEX) 2 MG tablet, Take 1-2 tablets by mouth At Night As Needed for Muscle Spasms., Disp: 60 tablet, Rfl: 2     Allergies   Allergen Reactions   • Iodine Shortness Of Breath and Swelling       Social History     Tobacco Use   • Smoking status: Former Smoker     Packs/day: 1.00     Years: 20.00     Pack years: 20.00     Quit date:      Years since quittin.3   • Smokeless tobacco: Never Used   Vaping Use   • Vaping Use: Never used   Substance Use Topics   • Alcohol use: Never   • Drug use: Never  "       Objective     Vital Signs:   Temp 97.5 °F (36.4 °C) (Temporal)   Ht 165.1 cm (65\")   Wt 76.7 kg (169 lb)   BMI 28.12 kg/m²       Physical Exam    General: Well developed, well nourished patient of stated age in no acute distress. Voice is strong and clear.   Head: Normocephalic and atraumatic.  Face: No lesions.  Bilateral parotid and submandibular glands are unremarkable.  Stensen's and Warthin's ducts are productive of clear saliva bilaterally.  House-Brackmann I/VI     bilaterally.   muscles and temporomandibular joint nontender to palpation.  TMJ crepitus.  Eyes: PERRLA, sclerae anicteric, no conjunctival injection. Extraocular movements are intact and full. No nystagmus.   Ears: Auricles are normal in appearance. Bilateral external auditory canals are unremarkable. Bilateral tympanic membranes are clear and without effusion. Hearing normal to conversational voice.   Nose: External nose is normal in appearance. Bilateral nares are patent with normal appearing mucosa. Septum deviated to the right.  Bilateral inferior turbinates are hypertrophied. No lesions.   Oral Cavity: Lips are normal in appearance. Oral mucosa is unremarkable. Gingiva is unremarkable. Normal dentition for age. Tongue is unremarkable with good movement. Hard palate is unremarkable.   Oropharynx: Soft palate is unremarkable with full movement. Uvula is unremarkable. Bilateral tonsils are unremarkable. Posterior oropharynx is unremarkable.    Larynx and hypopharynx: Deferred secondary to gag reflex.  Neck: Supple.  No mass.  Nontender to palpation.  Trachea midline. Thyroid normal size and without nodules to palpation.   Lymphatic: No lymphadenopathy upon palpation.  Respiratory: Clear to auscultation bilaterally, nonlabored respirations    Cardiovascular: RRR, no murmurs, rubs, or gallops,   Psychiatric: Appropriate affect, cooperative   Neurologic: Oriented x 3, strength symmetric in all extremities, Cranial Nerves II-XII " are grossly intact to confrontation   Skin: Warm and dry. No rashes.    Procedures           Result Review :               Assessment and Plan    Diagnoses and all orders for this visit:    1. Allergic rhinitis, unspecified seasonality, unspecified trigger (Primary)    2. Headache disorder    3. Nasal septal deviation    4. Hypertrophy of both inferior nasal turbinates      Impressions and findings were discussed at great length.  Currently, she is seen for evaluation of 7 to 10-day episodes of headache, earache, nasal congestion, nausea, and occasional photophobia which have not improved after bilateral maxillary antrostomies and partial anterior ethmoidectomies at the [facility] in July 2021.  We reviewed and discussed the images from her recent CT scan which revealed left greater than right maxillary sinus mucous retention cysts but was otherwise unremarkable.  We discussed the differential for his symptoms including recurrent acute rhinosinusitis versus allergic rhinitis and a headache disorder such as migraine or TMJ.  Options for further evaluation and management were discussed including seeing her back when she is acutely symptomatic versus adding azelastine nasal spray to her current nasal regimen versus treating this more like a migraine issue.  After thorough discussion she would like to be seen when acutely symptomatic and will call to arrange follow-up.      Follow Up   No follow-ups on file.  Patient was given instructions and counseling regarding her condition or for health maintenance advice. Please see specific information pulled into the AVS if appropriate.

## 2022-10-05 ENCOUNTER — OFFICE VISIT (OUTPATIENT)
Dept: INTERNAL MEDICINE | Facility: CLINIC | Age: 57
End: 2022-10-05

## 2022-10-05 VITALS
BODY MASS INDEX: 27.39 KG/M2 | OXYGEN SATURATION: 98 % | SYSTOLIC BLOOD PRESSURE: 125 MMHG | HEART RATE: 61 BPM | TEMPERATURE: 97.6 F | DIASTOLIC BLOOD PRESSURE: 71 MMHG | WEIGHT: 164.4 LBS | HEIGHT: 65 IN

## 2022-10-05 DIAGNOSIS — J01.00 ACUTE NON-RECURRENT MAXILLARY SINUSITIS: Primary | ICD-10-CM

## 2022-10-05 LAB
EXPIRATION DATE: NORMAL
EXPIRATION DATE: NORMAL
FLUAV AG SPEC QL: NOT DETECTED
FLUBV AG SPEC QL: NOT DETECTED
INTERNAL CONTROL: NORMAL
INTERNAL CONTROL: NORMAL
Lab: NORMAL
Lab: NORMAL
S PYO AG THROAT QL: NEGATIVE
SARS-COV-2 AG UPPER RESP QL IA.RAPID: NOT DETECTED

## 2022-10-05 PROCEDURE — 87426 SARSCOV CORONAVIRUS AG IA: CPT | Performed by: NURSE PRACTITIONER

## 2022-10-05 PROCEDURE — 87081 CULTURE SCREEN ONLY: CPT | Performed by: NURSE PRACTITIONER

## 2022-10-05 PROCEDURE — 87880 STREP A ASSAY W/OPTIC: CPT | Performed by: NURSE PRACTITIONER

## 2022-10-05 PROCEDURE — 99214 OFFICE O/P EST MOD 30 MIN: CPT | Performed by: NURSE PRACTITIONER

## 2022-10-05 RX ORDER — METHYLPREDNISOLONE 4 MG/1
TABLET ORAL
Qty: 21 EACH | Refills: 0 | Status: SHIPPED | OUTPATIENT
Start: 2022-10-05

## 2022-10-05 RX ORDER — AMOXICILLIN AND CLAVULANATE POTASSIUM 875; 125 MG/1; MG/1
1 TABLET, FILM COATED ORAL 2 TIMES DAILY
Qty: 20 TABLET | Refills: 0 | Status: SHIPPED | OUTPATIENT
Start: 2022-10-05 | End: 2022-10-15

## 2022-10-05 RX ORDER — SPIRONOLACTONE 50 MG/1
TABLET, FILM COATED ORAL
COMMUNITY
Start: 2022-09-27

## 2022-10-05 RX ORDER — CLINDAMYCIN PHOSPHATE AND BENZOYL PEROXIDE 10; 50 MG/G; MG/G
GEL TOPICAL
COMMUNITY
Start: 2022-09-27

## 2022-10-05 NOTE — PROGRESS NOTES
Chief Complaint  Sore Throat (Duration of multiple weeks), Headache (Duration of multiple weeks ), and Eye Problem (Swollen, duration of multiple weeks, blurred )    Joseph Sheikh presents to [facility]  Sinusitis  This is a new problem. Episode onset: 2 weeks  The problem is unchanged. There has been no fever. She is experiencing no pain. Associated symptoms include congestion, headaches and sinus pressure. Pertinent negatives include no chills, coughing, diaphoresis, ear pain, hoarse voice, neck pain, shortness of breath, sneezing, sore throat or swollen glands. Past treatments include saline sprays. The treatment provided no relief.     Current Outpatient Medications   Medication Instructions   • albuterol sulfate  (90 Base) MCG/ACT inhaler 2 puffs, Inhalation   • amoxicillin-clavulanate (AUGMENTIN) 875-125 MG per tablet 1 tablet, Oral, 2 Times Daily   • cetirizine (zyrTEC) 10 MG tablet Zyrtec 10 mg oral tablet take 1 tablet (10 mg) by oral route once daily   Active   • Cholecalciferol 1,000 Units, Oral, Daily   • Clindamycin Phos-Benzoyl Perox 1.2-5 % gel No dose, route, or frequency recorded.   • cycloSPORINE (Restasis) 0.05 % ophthalmic emulsion 1 drop   • fluticasone (FLONASE) 50 MCG/ACT nasal spray 2 sprays, Nasal, Daily   • gabapentin (NEURONTIN) 300 mg, Oral, 3 Times Daily   • Lactobacillus (Acidophilus) capsule Oral   • linaclotide (LINZESS) 145 MCG capsule capsule Linzess 145 mcg oral capsule take 1 capsule (145 mcg) by oral route once daily on an empty stomach at least 30 minutes before 1st meal of the day   Active   • methylPREDNISolone (Medrol) 4 MG dose pack Take as directed on package instructions.   • omeprazole (priLOSEC) 40 MG capsule omeprazole 40 mg oral capsule,delayed release(DR/EC) take 1 capsule (40 mg) by oral route once daily before a meal   Active   • spironolactone (ALDACTONE) 50 MG tablet No dose, route, or  "frequency recorded.   • spironolactone (ALDACTONE) 25 mg, Oral   • tiZANidine (ZANAFLEX) 2-4 mg, Oral, Nightly PRN       The following portions of the patient's history were reviewed and updated as appropriate: allergies, current medications, past family history, past medical history, past social history, past surgical history, and problem list.    Objective   Vital Signs:   /71 (BP Location: Left arm, Patient Position: Sitting, Cuff Size: Adult)   Pulse 61   Temp 97.6 °F (36.4 °C) (Temporal)   Ht 165.1 cm (65\")   Wt 74.6 kg (164 lb 6.4 oz)   SpO2 98%   BMI 27.36 kg/m²     Wt Readings from Last 3 Encounters:   10/05/22 74.6 kg (164 lb 6.4 oz)   05/11/22 76.7 kg (169 lb)   04/14/22 78.6 kg (173 lb 4 oz)     BP Readings from Last 3 Encounters:   10/05/22 125/71   04/14/22 118/77   03/24/22 151/72     Physical Exam  Vitals and nursing note reviewed.   Constitutional:       General: She is not in acute distress.     Appearance: Normal appearance. She is not ill-appearing.   HENT:      Right Ear: Tympanic membrane, ear canal and external ear normal.      Left Ear: Tympanic membrane, ear canal and external ear normal.      Nose: Congestion present.      Right Sinus: Maxillary sinus tenderness present.      Left Sinus: Maxillary sinus tenderness present.      Mouth/Throat:      Pharynx: Posterior oropharyngeal erythema present.   Eyes:      Extraocular Movements: Extraocular movements intact.      Conjunctiva/sclera: Conjunctivae normal.   Cardiovascular:      Rate and Rhythm: Normal rate and regular rhythm.      Pulses: Normal pulses.      Heart sounds: Normal heart sounds.   Pulmonary:      Effort: Pulmonary effort is normal.      Breath sounds: Normal breath sounds.   Skin:     General: Skin is warm and dry.      Capillary Refill: Capillary refill takes less than 2 seconds.   Neurological:      General: No focal deficit present.      Mental Status: She is alert and oriented to person, place, and time. Mental " status is at baseline.   Psychiatric:         Mood and Affect: Mood normal.         Behavior: Behavior normal.         Thought Content: Thought content normal.         Judgment: Judgment normal.              Result Review :   The following data was reviewed by: DEMARCUS Porter on 10/05/2022:           Lab Results   Component Value Date    SARSANTIGEN Not Detected 10/05/2022    FLUAAG Not Detected 10/05/2022    FLUBAG Not Detected 10/05/2022    RAPSCRN Negative 10/05/2022    INR 0.94 (L) 03/04/2021    BILIRUBINUR Negative 02/20/2022       Procedures        Assessment and Plan    Diagnoses and all orders for this visit:    1. Acute non-recurrent maxillary sinusitis (Primary)  -     POCT rapid strep A  -     amoxicillin-clavulanate (AUGMENTIN) 875-125 MG per tablet; Take 1 tablet by mouth 2 (Two) Times a Day for 10 days.  Dispense: 20 tablet; Refill: 0  -     methylPREDNISolone (Medrol) 4 MG dose pack; Take as directed on package instructions.  Dispense: 21 each; Refill: 0  -     Beta Strep Culture, Throat - , Throat; Future  -     POCT SARS-CoV-2 Antigen PO  -     Beta Strep Culture, Throat - Swab, Throat          Medications Discontinued During This Encounter   Medication Reason   • amoxicillin-clavulanate (AUGMENTIN) 875-125 MG per tablet *Therapy completed   • methylPREDNISolone (MEDROL) 4 MG dose pack *Therapy completed          Follow Up   Return if symptoms worsen or fail to improve.  Patient was given instructions and counseling regarding her condition or for health maintenance advice. Please see specific information pulled into the AVS if appropriate.       DEMARCUS Porter  10/05/22  14:57 EDT

## 2022-10-07 LAB — BACTERIA SPEC AEROBE CULT: NORMAL

## 2022-11-28 ENCOUNTER — OFFICE VISIT (OUTPATIENT)
Dept: OTOLARYNGOLOGY | Facility: CLINIC | Age: 57
End: 2022-11-28

## 2022-11-28 VITALS — HEIGHT: 65 IN | TEMPERATURE: 97.3 F | WEIGHT: 167 LBS | BODY MASS INDEX: 27.82 KG/M2

## 2022-11-28 DIAGNOSIS — K13.79 RECURRENT ORAL ULCERS: ICD-10-CM

## 2022-11-28 DIAGNOSIS — J30.9 ALLERGIC RHINITIS, UNSPECIFIED SEASONALITY, UNSPECIFIED TRIGGER: Primary | ICD-10-CM

## 2022-11-28 DIAGNOSIS — R51.9 HEADACHE DISORDER: ICD-10-CM

## 2022-11-28 DIAGNOSIS — J34.3 HYPERTROPHY OF BOTH INFERIOR NASAL TURBINATES: ICD-10-CM

## 2022-11-28 PROCEDURE — 99212 OFFICE O/P EST SF 10 MIN: CPT | Performed by: OTOLARYNGOLOGY

## 2022-11-28 NOTE — PROGRESS NOTES
"Patient Name: Lizbeth Sheikh   Visit Date: 11/28/2022   Patient ID: [id number]  Provider: Jaiden Fernández MD    Sex: female  Location: [facility]   YOB: 1965  Location Address: [address] [place] [address], [address] [place] [address],?[id number]    Primary Care Provider Kristine Santiago APRN  Location Phone: [phone]    Referring Provider: MATILDE Porter*        Chief Complaint  Sinusitis/Mouth sores    History of Present Illness  Lizbeth Sheikh is a 57 y.o. female who returns today for follow-up.  She was originally seen on 5/11/2022 at which time she reported \"constant sinus infections\".  Her typical infection involves headache, nasal congestion, earaches, occasional nausea, and occasional photophobia.  They typically last 7 to 10 days in duration and will often resolve without intervention.  She has not had any issues with rhinorrhea but does report a postnasal drainage.  She denied any hyposmia, cough, fevers, or chills.  She has tried Sudafed, saline irrigations, Flonase, and antibiotics without significant improvement.  She was on weekly immunotherapy.  She underwent endoscopic bilateral maxillary antrostomies and partial anterior ethmoidectomies at the [facility] in [place] in July 2021.  She does not feel like she has done any better since.  She denied any bruxism. CT scan of the sinuses without contrast on 4/19/2022 revealed left greater than right maxillary sinus mucous retention cysts, anterior septal deviation, and evidence of previous maxillary antrostomies and partial anterior ethmoidectomies.  Examination that day was unremarkable aside from a right nasal septal deviation and bilateral inferior turban hypertrophy.  After a thorough discussion she elected to return when acutely symptomatic.    She returns today for follow-up. She tells me that her nasal congestion is somewhat improved but still noticeable on the right side especially when she lays flat. Her " headaches and ear aches have improved some over the last few few weeks.  She denies any significant rhinorrhea.  She is now on twice weekly immunotherapy and continues to use fluticasone, azelastine, cetirizine, and saline.  She again mentions frequent oral lesions which are small, painful, and tend to resolve within a week.      Past Medical History:   Diagnosis Date   • Anemia    • Asthma    • Colon abnormality    • GERD (gastroesophageal reflux disease)    • Heart murmur    • Hemorrhoids    • Hernia cerebri (HCC)    • Night sweats        Past Surgical History:   Procedure Laterality Date   • BREAST BIOPSY Right    • COLONOSCOPY  2009    FORT CALLAWAY   • HYSTERECTOMY           Current Outpatient Medications:   •  albuterol sulfate  (90 Base) MCG/ACT inhaler, Inhale 2 puffs., Disp: , Rfl:   •  cetirizine (zyrTEC) 10 MG tablet, Zyrtec 10 mg oral tablet take 1 tablet (10 mg) by oral route once daily   Active, Disp: , Rfl:   •  Cholecalciferol 25 MCG (1000 UT) capsule, Take 1,000 Units by mouth Daily., Disp: , Rfl:   •  Clindamycin Phos-Benzoyl Perox 1.2-5 % gel, , Disp: , Rfl:   •  cycloSPORINE (Restasis) 0.05 % ophthalmic emulsion, 1 drop., Disp: , Rfl:   •  fluticasone (FLONASE) 50 MCG/ACT nasal spray, 2 sprays into the nostril(s) as directed by provider Daily., Disp: , Rfl:   •  gabapentin (NEURONTIN) 300 MG capsule, Take 300 mg by mouth 3 (Three) Times a Day., Disp: , Rfl:   •  Lactobacillus (Acidophilus) capsule, Take  by mouth., Disp: , Rfl:   •  linaclotide (LINZESS) 145 MCG capsule capsule, Linzess 145 mcg oral capsule take 1 capsule (145 mcg) by oral route once daily on an empty stomach at least 30 minutes before 1st meal of the day   Active, Disp: , Rfl:   •  methylPREDNISolone (Medrol) 4 MG dose pack, Take as directed on package instructions., Disp: 21 each, Rfl: 0  •  omeprazole (priLOSEC) 40 MG capsule, omeprazole 40 mg oral capsule,delayed release(DR/EC) take 1 capsule (40 mg) by oral route once  "daily before a meal   Active, Disp: , Rfl:   •  spironolactone (ALDACTONE) 25 MG tablet, Take 25 mg by mouth., Disp: , Rfl:   •  spironolactone (ALDACTONE) 50 MG tablet, , Disp: , Rfl:   •  tiZANidine (ZANAFLEX) 2 MG tablet, Take 1-2 tablets by mouth At Night As Needed for Muscle Spasms., Disp: 60 tablet, Rfl: 2     Allergies   Allergen Reactions   • Iodine Shortness Of Breath and Swelling       Social History     Tobacco Use   • Smoking status: Former     Packs/day: 1.00     Years: 20.00     Pack years: 20.00     Types: Cigarettes     Quit date:      Years since quittin.9   • Smokeless tobacco: Never   Vaping Use   • Vaping Use: Never used   Substance Use Topics   • Alcohol use: Never   • Drug use: Never        Objective     Vital Signs:   Temp 97.3 °F (36.3 °C) (Temporal)   Ht 165.1 cm (65\")   Wt 75.8 kg (167 lb)   BMI 27.79 kg/m²       Physical Exam    General: Well developed, well nourished patient of stated age in no acute distress. Voice is strong and clear.   Head: Normocephalic and atraumatic.  Face: No lesions.  Bilateral parotid and submandibular glands are unremarkable.  Stensen's and Warthin's ducts are productive of clear saliva bilaterally.  House-Brackmann I/VI     bilaterally.   muscles and temporomandibular joint nontender to palpation.  TMJ crepitus.  Eyes: PERRLA, sclerae anicteric, no conjunctival injection. Extraocular movements are intact and full. No nystagmus.   Ears: Auricles are normal in appearance. Bilateral external auditory canals are unremarkable. Bilateral tympanic membranes are clear and without effusion. Hearing normal to conversational voice.   Nose: External nose is normal in appearance. Bilateral nares are patent with normal appearing mucosa. Septum deviated to the right.  Bilateral inferior turbinates are hypertrophied. No lesions.   Oral Cavity: Lips are normal in appearance. Oral mucosa is unremarkable. Gingiva is unremarkable. Normal dentition for age. " Tongue is unremarkable with good movement. Hard palate is unremarkable.   Oropharynx: Soft palate is unremarkable with full movement. Uvula is unremarkable. Bilateral tonsils are unremarkable. Posterior oropharynx is unremarkable.    Larynx and hypopharynx: Deferred secondary to gag reflex.  Neck: Supple.  No mass.  Nontender to palpation.  Trachea midline. Thyroid normal size and without nodules to palpation.   Lymphatic: No lymphadenopathy upon palpation.   Psychiatric: Appropriate affect, cooperative   Neurologic: Oriented x 3, strength symmetric in all extremities, Cranial Nerves II-XII are grossly intact to confrontation   Skin: Warm and dry. No rashes.    Procedures           Result Review :               Assessment and Plan    Diagnoses and all orders for this visit:    1. Allergic rhinitis, unspecified seasonality, unspecified trigger (Primary)    2. Hypertrophy of both inferior nasal turbinates    3. Headache disorder    4. Recurrent oral ulcers      Impressions and findings were again discussed at great length.  Currently, her symptoms have somewhat improved over the last few weeks.  We again discussed the differential including options for further evaluation and management including nasal endoscopy.  She again would like to hold off until she is acutely symptomatic.  She was provided with my cell phone to call to arrange follow-up when her symptoms are severe.  She will continue with her current nasal regimen.  She was given ample time to ask questions, all of which were answered to her satisfaction.      Follow Up   Return if symptoms worsen or fail to improve.  Patient was given instructions and counseling regarding her condition or for health maintenance advice. Please see specific information pulled into the AVS if appropriate.

## 2023-02-28 ENCOUNTER — OFFICE VISIT (OUTPATIENT)
Dept: NEUROSURGERY | Facility: CLINIC | Age: 58
End: 2023-02-28
Payer: OTHER GOVERNMENT

## 2023-02-28 VITALS
HEART RATE: 77 BPM | WEIGHT: 176 LBS | BODY MASS INDEX: 29.32 KG/M2 | HEIGHT: 65 IN | DIASTOLIC BLOOD PRESSURE: 54 MMHG | SYSTOLIC BLOOD PRESSURE: 124 MMHG

## 2023-02-28 DIAGNOSIS — M47.812 CERVICAL SPONDYLOSIS WITHOUT MYELOPATHY: Primary | ICD-10-CM

## 2023-02-28 DIAGNOSIS — M25.512 ACUTE PAIN OF LEFT SHOULDER: ICD-10-CM

## 2023-02-28 PROCEDURE — 99214 OFFICE O/P EST MOD 30 MIN: CPT | Performed by: NURSE PRACTITIONER

## 2023-02-28 NOTE — PROGRESS NOTES
Chief Complaint  Neck Pain (Neck pain that radiates into left shoulder)    Subjective          Lizbeth Sheikh who is a 58 y.o. year old female who presents to [facility] for worsening neck and arm pain.     History of Present Illness  Pt with history of chronic neck pain. We have referred her to physical therapy in the past, though she has not gone due to cost. At her last visit she was having concerns of numbness into digits 4 and 5 of the left hand. We repeated her MRI Cervical Spine, demonstrating stable cervical spondylosis. She also had an EMG/NCV which was normal. Evaluated by Neurology, who felt clinically symptoms represented an ulnar neuropathy and recommended elbow splints. She used the splints and these symptoms have resolved.     She is having concerns of neck pain, in the posterior neck and cervical paraspinals. Pain is worse on the left side and into the shoulder. She has concerns of limited range of the motion in the shoulder and pain in the anterior chest and scapula. Rates her pain today a 5/10. Pain is constant. Having concerns of pain into the back of the head with headaches. She has been taking Gabapentin, Diclofenac and Tizanidine. Will utilize lidocaine patches as well.     She has not had any recent physical therapy. She has never seen pain management.      Interval History 11/9/21     Lizbeth Sheikh who is a 56 y.o. year old female who presents to [facility] for concerns of left hand numbness.     Pt reports shortly after her visit in October she developed constant numbness in digits 4 and 5 of the left hand. This has not gone away. Denies pain in the left arm or arm. Denies weakness in the left arm or hand. She has chronic neck pain. Has not started physical therapy due to cost. [facility]. She has been doing exercises at home, routine stretching.        Interval History  "10/14/21     Lizbeth Sheikh who is a 56 y.o. year old female who presents to [facility] for follow up of neck pain with radiculopathy.      At patient's last visit we referred to physical therapy. She had planned to go but could not afford the copay. [facility]. Having pain primarily in the neck. She is now having concerns of daily headache, starting in the back of her head and radiating up into the temporal region. This occurs on both sides. Pain is aching, throbbing, severe at times. Effecting her sleep. She has been taking ibuprofen without much relief.     Recent Interventions: See above      Review of Systems   Musculoskeletal: Positive for myalgias, neck pain, neck stiffness and bursitis.   Neurological: Positive for weakness.   All other systems reviewed and are negative.       Objective   Vital Signs:   /54 (BP Location: Left arm, Patient Position: Sitting, Cuff Size: Large Adult)   Pulse 77   Ht 165.1 cm (65\")   Wt 79.8 kg (176 lb)   BMI 29.29 kg/m²       Physical Exam  Vitals reviewed.   Constitutional:       Appearance: Normal appearance.   Musculoskeletal:      Right shoulder: No tenderness. Normal range of motion.      Left shoulder: Tenderness present. Decreased range of motion.      Cervical back: Tenderness present. Pain with movement present. Decreased range of motion.   Neurological:      Mental Status: She is alert and oriented to person, place, and time.      Motor: Motor strength is normal.      Gait: Gait is intact.      Deep Tendon Reflexes:      Reflex Scores:       Tricep reflexes are 2+ on the right side and 2+ on the left side.       Bicep reflexes are 2+ on the right side and 2+ on the left side.       Brachioradialis reflexes are 2+ on the right side and 2+ on the left side.       Neurologic Exam     Mental Status   Oriented to person, place, and time.   Level of consciousness: " alert    Motor Exam   Muscle bulk: normal  Overall muscle tone: normal    Strength   Strength 5/5 throughout.     Sensory Exam   Light touch normal.     Gait, Coordination, and Reflexes     Gait  Gait: normal    Reflexes   Right brachioradialis: 2+  Left brachioradialis: 2+  Right biceps: 2+  Left biceps: 2+  Right triceps: 2+  Left triceps: 2+  Right Lorenzo: absent  Left Lorenzo: absent       Result Review :       Data reviewed: Radiologic studies MRI Cervical Spine on 11/30/21 demonstrated multilevel DDD with facet arthropathy, contributing to mild moderate foraminal narrowing without spinal canal stenosis.        EMG/NCV 11/11/21 was normal.      Assessment and Plan    Diagnoses and all orders for this visit:    1. Cervical spondylosis without myelopathy (Primary)  -     Ambulatory Referral to Physical Therapy Evaluate and treat; Heat, Electrotherapy; Moist heat; Tens (Home), E-stim; Cross Fiber; Stretching (Traction), ROM, Strengthening    2. Acute pain of left shoulder  -     XR Shoulder 2+ View Left; Future    Pt having concerns of worsening neck pain with new left shoulder pain. Her exam demonstrates pain most significant in the left shoulder with reduced range of motion in the shoulder. Will proceed with XR of the shoulder. Refer to physical therapy for neck and shoulder pain. She will follow up in 6 weeks and if no improvement would consider referral to Orthopedics vs pain management.       Follow Up   Return in about 6 weeks (around 4/11/2023).  Patient was given instructions and counseling regarding her condition or for health maintenance advice.       -Physical therapy   -XR Left shoulder  -Follow up in 6 weeks

## 2023-04-24 ENCOUNTER — TELEPHONE (OUTPATIENT)
Dept: NEUROSURGERY | Facility: CLINIC | Age: 58
End: 2023-04-24
Payer: OTHER GOVERNMENT

## 2023-05-02 ENCOUNTER — OFFICE VISIT (OUTPATIENT)
Dept: NEUROSURGERY | Facility: CLINIC | Age: 58
End: 2023-05-02
Payer: OTHER GOVERNMENT

## 2023-05-02 VITALS
WEIGHT: 179.7 LBS | SYSTOLIC BLOOD PRESSURE: 122 MMHG | DIASTOLIC BLOOD PRESSURE: 50 MMHG | HEIGHT: 65 IN | BODY MASS INDEX: 29.94 KG/M2 | HEART RATE: 79 BPM

## 2023-05-02 DIAGNOSIS — G44.86 CERVICOGENIC HEADACHE: ICD-10-CM

## 2023-05-02 DIAGNOSIS — M25.511 PAIN OF BOTH SHOULDER JOINTS: ICD-10-CM

## 2023-05-02 DIAGNOSIS — M47.812 CERVICAL SPONDYLOSIS WITHOUT MYELOPATHY: Primary | ICD-10-CM

## 2023-05-02 DIAGNOSIS — M25.512 PAIN OF BOTH SHOULDER JOINTS: ICD-10-CM

## 2023-05-02 NOTE — PROGRESS NOTES
Chief Complaint  Follow-up ([facility] is not going well)    Subjective          Lizbeth Sheikh who is a 58 y.o. year old female who presents to [facility] for follow up of neck and shoulder pain.     History of Present Illness  Pt reports that she has been going to physical therapy one day a week for several weeks. She reports that they told her they could not help her with the type of pain she is having.     Her pain is primarily in the left side of neck, anterior chest, scapula, and in the left shoulder. She denies pain into the arm. She did not get the XR of her shoulder.     She is having headaches on the left side, starting at the occiput and radiating into the vertex.       Interval History 2/28/23     Lizbeth Sheikh who is a 58 y.o. year old female who presents to [facility] for worsening neck and arm pain.      History of Present Illness  Pt with history of chronic neck pain. We have referred her to physical therapy in the past, though she has not gone due to cost. At her last visit she was having concerns of numbness into digits 4 and 5 of the left hand. We repeated her MRI Cervical Spine, demonstrating stable cervical spondylosis. She also had an EMG/NCV which was normal. Evaluated by Neurology, who felt clinically symptoms represented an ulnar neuropathy and recommended elbow splints. She used the splints and these symptoms have resolved.      She is having concerns of neck pain, in the posterior neck and cervical paraspinals. Pain is worse on the left side and into the shoulder. She has concerns of limited range of the motion in the shoulder and pain in the anterior chest and scapula. Rates her pain today a 5/10. Pain is constant. Having concerns of pain into the back of the head with headaches. She has been taking Gabapentin, Diclofenac and Tizanidine. Will utilize lidocaine patches as well.  "     She has not had any recent physical therapy. She has never seen pain management.    Recent Interventions: PT      Review of Systems   Musculoskeletal: Positive for myalgias, neck pain, neck stiffness and bursitis.   Neurological: Positive for weakness.   All other systems reviewed and are negative.       Objective   Vital Signs:   /50 (BP Location: Left arm, Patient Position: Sitting, Cuff Size: Adult)   Pulse 79   Ht 165.1 cm (65\")   Wt 81.5 kg (179 lb 11.2 oz)   BMI 29.90 kg/m²       Physical Exam  Vitals reviewed.   Constitutional:       Appearance: Normal appearance.   Musculoskeletal:      Right shoulder: No tenderness. Normal range of motion.      Left shoulder: Tenderness present. Decreased range of motion.      Cervical back: Tenderness present. Pain with movement present. Decreased range of motion.   Neurological:      Mental Status: She is alert and oriented to person, place, and time.      Motor: Motor strength is normal.      Gait: Gait is intact.      Deep Tendon Reflexes:      Reflex Scores:       Tricep reflexes are 2+ on the right side and 2+ on the left side.       Bicep reflexes are 2+ on the right side and 2+ on the left side.       Brachioradialis reflexes are 2+ on the right side and 2+ on the left side.       Neurologic Exam     Mental Status   Oriented to person, place, and time.   Level of consciousness: alert    Motor Exam   Muscle bulk: normal  Overall muscle tone: normal    Strength   Strength 5/5 throughout.     Sensory Exam   Light touch normal.     Gait, Coordination, and Reflexes     Gait  Gait: normal    Reflexes   Right brachioradialis: 2+  Left brachioradialis: 2+  Right biceps: 2+  Left biceps: 2+  Right triceps: 2+  Left triceps: 2+  Right Lorenzo: absent  Left Lorenzo: absent       Result Review :                 Assessment and Plan    Diagnoses and all orders for this visit:    1. Cervical spondylosis without myelopathy (Primary)  -     MRI Cervical Spine Without " Contrast; Future    2. Cervicogenic headache  -     MRI Cervical Spine Without Contrast; Future    3. Pain of both shoulder joints    Pt's neck and shoulder pain has not improved with physical therapy. Will proceed with MRI Cervical Spine. She has been instructed to get the XR of her shoulder the same she gets her MRI. She will follow up in 4 weeks to evaluate her imaging and discuss interventional management.       Follow Up   Return in about 4 weeks (around 5/30/2023).  Patient was given instructions and counseling regarding her condition or for health maintenance advice.       -MRI Cervical Spine  -XR Left Shoulder  -Follow up in 4 weeks

## 2023-05-10 ENCOUNTER — HOSPITAL ENCOUNTER (OUTPATIENT)
Dept: MRI IMAGING | Facility: HOSPITAL | Age: 58
Discharge: HOME | End: 2023-05-10
Payer: OTHER GOVERNMENT

## 2023-05-10 ENCOUNTER — HOSPITAL ENCOUNTER (OUTPATIENT)
Dept: GENERAL RADIOLOGY | Facility: HOSPITAL | Age: 58
Discharge: HOME | End: 2023-05-10
Payer: OTHER GOVERNMENT

## 2023-05-10 DIAGNOSIS — G44.86 CERVICOGENIC HEADACHE: ICD-10-CM

## 2023-05-10 DIAGNOSIS — M47.812 CERVICAL SPONDYLOSIS WITHOUT MYELOPATHY: ICD-10-CM

## 2023-05-10 DIAGNOSIS — M25.512 ACUTE PAIN OF LEFT SHOULDER: ICD-10-CM

## 2023-05-10 PROCEDURE — 72141 MRI NECK SPINE W/O DYE: CPT

## 2023-05-10 PROCEDURE — 73030 X-RAY EXAM OF SHOULDER: CPT

## 2023-05-11 ENCOUNTER — TELEPHONE (OUTPATIENT)
Dept: NEUROSURGERY | Facility: CLINIC | Age: 58
End: 2023-05-11
Payer: OTHER GOVERNMENT

## 2023-05-11 NOTE — TELEPHONE ENCOUNTER
Per Lucille,    XR Left Shoulder demonstrating mild arthritis changes.    MRI Cervical Spine demonstrating stable multilevel degenerative changes which have not significantly changed. Will review at follow up.      Patient informed.

## 2023-05-11 NOTE — PROGRESS NOTES
MRI Cervical Spine demonstrating stable multilevel degenerative changes which have not significantly changed. Will review at follow up.

## 2023-10-03 ENCOUNTER — TELEPHONE (OUTPATIENT)
Dept: INTERNAL MEDICINE | Facility: CLINIC | Age: 58
End: 2023-10-03
Payer: OTHER GOVERNMENT

## 2023-10-03 NOTE — TELEPHONE ENCOUNTER
Caller: Lizbeth Sheikh    Relationship: Self    Best call back number: [phone]     What medication are you requesting: ANTIBIOTIC/Z PACK    What are your current symptoms: SINUS CONGESTION    How long have you been experiencing symptoms: 4 DAYS    Have you had these symptoms before:    [] Yes  [x] No    Have you been treated for these symptoms before:   [] Yes  [x] No    If a prescription is needed, what is your preferred pharmacy and phone number: [facility] - [address] [phone] [facility] [phone]

## 2023-10-10 ENCOUNTER — OFFICE VISIT (OUTPATIENT)
Dept: INTERNAL MEDICINE | Facility: CLINIC | Age: 58
End: 2023-10-10
Payer: OTHER GOVERNMENT

## 2023-10-10 VITALS
TEMPERATURE: 97 F | OXYGEN SATURATION: 100 % | WEIGHT: 173.4 LBS | DIASTOLIC BLOOD PRESSURE: 73 MMHG | BODY MASS INDEX: 28.89 KG/M2 | HEART RATE: 70 BPM | SYSTOLIC BLOOD PRESSURE: 121 MMHG | HEIGHT: 65 IN

## 2023-10-10 DIAGNOSIS — M62.838 MUSCLE SPASMS OF NECK: Primary | ICD-10-CM

## 2023-10-10 DIAGNOSIS — Z13.220 SCREENING FOR LIPID DISORDERS: ICD-10-CM

## 2023-10-10 DIAGNOSIS — Z11.59 NEED FOR HEPATITIS C SCREENING TEST: ICD-10-CM

## 2023-10-10 LAB
ALBUMIN SERPL-MCNC: 4.9 G/DL (ref 3.5–5.2)
ALBUMIN/GLOB SERPL: 2 G/DL
ALP SERPL-CCNC: 54 U/L (ref 39–117)
ALT SERPL W P-5'-P-CCNC: 18 U/L (ref 1–33)
ANION GAP SERPL CALCULATED.3IONS-SCNC: 8 MMOL/L (ref 5–15)
AST SERPL-CCNC: 20 U/L (ref 1–32)
BASOPHILS # BLD AUTO: 0.02 10*3/MM3 (ref 0–0.2)
BASOPHILS NFR BLD AUTO: 0.5 % (ref 0–1.5)
BILIRUB SERPL-MCNC: 0.3 MG/DL (ref 0–1.2)
BUN SERPL-MCNC: 13 MG/DL (ref 6–20)
BUN/CREAT SERPL: 19.4 (ref 7–25)
CALCIUM SPEC-SCNC: 9.9 MG/DL (ref 8.6–10.5)
CHLORIDE SERPL-SCNC: 103 MMOL/L (ref 98–107)
CHOLEST SERPL-MCNC: 218 MG/DL (ref 0–200)
CO2 SERPL-SCNC: 28 MMOL/L (ref 22–29)
CREAT SERPL-MCNC: 0.67 MG/DL (ref 0.57–1)
DEPRECATED RDW RBC AUTO: 44.7 FL (ref 37–54)
EGFRCR SERPLBLD CKD-EPI 2021: 100.8 ML/MIN/1.73
EOSINOPHIL # BLD AUTO: 0.1 10*3/MM3 (ref 0–0.4)
EOSINOPHIL NFR BLD AUTO: 2.6 % (ref 0.3–6.2)
ERYTHROCYTE [DISTWIDTH] IN BLOOD BY AUTOMATED COUNT: 14.1 % (ref 12.3–15.4)
GLOBULIN UR ELPH-MCNC: 2.5 GM/DL
GLUCOSE SERPL-MCNC: 82 MG/DL (ref 65–99)
HCT VFR BLD AUTO: 39.8 % (ref 34–46.6)
HDLC SERPL-MCNC: 50 MG/DL (ref 40–60)
HGB BLD-MCNC: 13.3 G/DL (ref 12–15.9)
IMM GRANULOCYTES # BLD AUTO: 0.01 10*3/MM3 (ref 0–0.05)
IMM GRANULOCYTES NFR BLD AUTO: 0.3 % (ref 0–0.5)
LDLC SERPL CALC-MCNC: 154 MG/DL (ref 0–100)
LDLC/HDLC SERPL: 3.04 {RATIO}
LYMPHOCYTES # BLD AUTO: 1.32 10*3/MM3 (ref 0.7–3.1)
LYMPHOCYTES NFR BLD AUTO: 34.3 % (ref 19.6–45.3)
MCH RBC QN AUTO: 29.1 PG (ref 26.6–33)
MCHC RBC AUTO-ENTMCNC: 33.4 G/DL (ref 31.5–35.7)
MCV RBC AUTO: 87.1 FL (ref 79–97)
MONOCYTES # BLD AUTO: 0.43 10*3/MM3 (ref 0.1–0.9)
MONOCYTES NFR BLD AUTO: 11.2 % (ref 5–12)
NEUTROPHILS NFR BLD AUTO: 1.97 10*3/MM3 (ref 1.7–7)
NEUTROPHILS NFR BLD AUTO: 51.1 % (ref 42.7–76)
NRBC BLD AUTO-RTO: 0 /100 WBC (ref 0–0.2)
PLATELET # BLD AUTO: 195 10*3/MM3 (ref 140–450)
PMV BLD AUTO: 12 FL (ref 6–12)
POTASSIUM SERPL-SCNC: 4.1 MMOL/L (ref 3.5–5.2)
PROT SERPL-MCNC: 7.4 G/DL (ref 6–8.5)
RBC # BLD AUTO: 4.57 10*6/MM3 (ref 3.77–5.28)
SODIUM SERPL-SCNC: 139 MMOL/L (ref 136–145)
TRIGL SERPL-MCNC: 79 MG/DL (ref 0–150)
TSH SERPL DL<=0.05 MIU/L-ACNC: 1.2 UIU/ML (ref 0.27–4.2)
VLDLC SERPL-MCNC: 14 MG/DL (ref 5–40)
WBC NRBC COR # BLD: 3.85 10*3/MM3 (ref 3.4–10.8)

## 2023-10-10 PROCEDURE — 85025 COMPLETE CBC W/AUTO DIFF WBC: CPT | Performed by: NURSE PRACTITIONER

## 2023-10-10 PROCEDURE — 84443 ASSAY THYROID STIM HORMONE: CPT | Performed by: NURSE PRACTITIONER

## 2023-10-10 PROCEDURE — 99213 OFFICE O/P EST LOW 20 MIN: CPT | Performed by: NURSE PRACTITIONER

## 2023-10-10 PROCEDURE — 80061 LIPID PANEL: CPT | Performed by: NURSE PRACTITIONER

## 2023-10-10 PROCEDURE — 86803 HEPATITIS C AB TEST: CPT | Performed by: NURSE PRACTITIONER

## 2023-10-10 PROCEDURE — 80053 COMPREHEN METABOLIC PANEL: CPT | Performed by: NURSE PRACTITIONER

## 2023-10-10 RX ORDER — CYCLOBENZAPRINE HYDROCHLORIDE 7.5 MG/1
7.5 TABLET, FILM COATED ORAL 3 TIMES DAILY PRN
Qty: 90 TABLET | Refills: 1 | Status: SHIPPED | OUTPATIENT
Start: 2023-10-10

## 2023-10-10 NOTE — PROGRESS NOTES
"Chief Complaint  Body Aches  Subjective          Lizbeth Sheikh presents to [facility]  History of Present Illness    Muscle aches all over body x 2 weeks. All over. Feels like muscles, not joints.   Denies fever.     Current Outpatient Medications   Medication Instructions    albuterol sulfate  (90 Base) MCG/ACT inhaler 2 puffs, Inhalation    cetirizine (zyrTEC) 10 MG tablet Zyrtec 10 mg oral tablet take 1 tablet (10 mg) by oral route once daily   Active    Cholecalciferol 1,000 Units, Oral, Daily    Clindamycin Phos-Benzoyl Perox 1.2-5 % gel No dose, route, or frequency recorded.    cyclobenzaprine (FEXMID) 7.5 mg, Oral, 3 Times Daily PRN    cycloSPORINE (Restasis) 0.05 % ophthalmic emulsion 1 drop    Diclofenac Sodium (VOLTAREN) 4 g, Topical, 4 Times Daily PRN    fluticasone (FLONASE) 50 MCG/ACT nasal spray 2 sprays, Nasal, Daily    gabapentin (NEURONTIN) 300 mg, Oral, 3 Times Daily    Lactobacillus (Acidophilus) capsule Oral    linaclotide (LINZESS) 145 MCG capsule capsule Linzess 145 mcg oral capsule take 1 capsule (145 mcg) by oral route once daily on an empty stomach at least 30 minutes before 1st meal of the day   Active    omeprazole (priLOSEC) 40 MG capsule omeprazole 40 mg oral capsule,delayed release(DR/EC) take 1 capsule (40 mg) by oral route once daily before a meal   Active    spironolactone (ALDACTONE) 50 MG tablet No dose, route, or frequency recorded.    spironolactone (ALDACTONE) 25 mg, Oral       The following portions of the patient's history were reviewed and updated as appropriate: allergies, current medications, past family history, past medical history, past social history, past surgical history, and problem list.    Objective   Vital Signs:   /73 (BP Location: Left arm, Patient Position: Sitting, Cuff Size: Large Adult)   Pulse 70   Temp 97 øF (36.1 øC) (Temporal)   Ht 165.1 cm (65\")   Wt 78.7 kg (173 lb 6.4 oz)   SpO2 100%  "  BMI 28.86 kg/mý     BP Readings from Last 3 Encounters:   10/10/23 121/73   05/02/23 122/50   02/28/23 124/54     Wt Readings from Last 3 Encounters:   10/10/23 78.7 kg (173 lb 6.4 oz)   05/02/23 81.5 kg (179 lb 11.2 oz)   02/28/23 79.8 kg (176 lb)           Physical Exam     Appearance: No acute distress, well-nourished  Head: normocephalic, atraumatic  Eyes: extraocular movements intact, no scleral icterus, no conjunctival injection  Ears, Nose, and Throat: external ears normal, nares patent, moist mucous membranes  Cardiovascular: regular rate and rhythm. no murmurs, rubs, or gallops. no edema  Respiratory: breathing comfortably, symmetric chest rise, clear to auscultation bilaterally. No wheezes, rales, or rhonchi.  Neuro: alert and oriented to time, place, and person. Normal gait  Psych: normal mood and affect     Result Review :   The following data was reviewed by: DEMARCUS Porter on 10/10/2023:  Common labs          10/10/2023    13:52   Common Labs   Glucose 82    BUN 13    Creatinine 0.67    Sodium 139    Potassium 4.1    Chloride 103    Calcium 9.9    Albumin 4.9    Total Bilirubin 0.3    Alkaline Phosphatase 54    AST (SGOT) 20    ALT (SGPT) 18    WBC 3.85    Hemoglobin 13.3    Hematocrit 39.8    Platelets 195    Total Cholesterol 218    Triglycerides 79    HDL Cholesterol 50    LDL Cholesterol  154             Lab Results   Component Value Date    SARSANTIGEN Not Detected 10/05/2022    FLUAAG Not Detected 10/05/2022    FLUBAG Not Detected 10/05/2022    RAPSCRN Negative 10/05/2022    INR 0.94 (L) 03/04/2021    BILIRUBINUR Negative 02/20/2022       Procedures        Assessment and Plan    Diagnoses and all orders for this visit:    1. Muscle spasms of neck (Primary)  -     cyclobenzaprine (FEXMID) 7.5 MG tablet; Take 1 tablet by mouth 3 (Three) Times a Day As Needed for Muscle Spasms.  Dispense: 90 tablet; Refill: 1  -     TSH Rfx On Abnormal To Free T4  -     Comprehensive Metabolic Panel  -      CBC & Differential  -     Diclofenac Sodium (VOLTAREN) 1 % gel gel; Apply 4 g topically to the appropriate area as directed 4 (Four) Times a Day As Needed (pain).  Dispense: 350 g; Refill: 0    2. Need for hepatitis C screening test  -     HCV Antibody Rfx To Qnt PCR    3. Screening for lipid disorders  -     Lipid Panel          Medications Discontinued During This Encounter   Medication Reason    tiZANidine (ZANAFLEX) 2 MG tablet Historical Med - Therapy completed          Follow Up   Return for Annual physical.  Patient was given instructions and counseling regarding her condition or for health maintenance advice. Please see specific information pulled into the AVS if appropriate.       Kristine Santiago, APRN  10/11/23  12:31 EDT

## 2023-10-12 LAB
HCV AB SERPL QL IA: NORMAL
HCV IGG SERPL QL IA: NON REACTIVE

## 2023-10-25 ENCOUNTER — TELEPHONE (OUTPATIENT)
Dept: OTOLARYNGOLOGY | Facility: CLINIC | Age: 58
End: 2023-10-25

## 2023-10-25 NOTE — TELEPHONE ENCOUNTER
Caller: LINDY KLEIN    Relationship: SELF    Best call back number: [phone]    What is your medical concern? PT BELIEVES SHE HAS A SINUS INFECTION AND IS WANTING AN ANTIBIOTIC CALLED IN FOR HER. NEXT AVAILABLE APPT IS NOT UNTIL JANUARY 24TH.    How long has this issue been going on? 2 WEEKS     Is your provider already aware of this issue? NO    Have you been treated for this issue? PT HAS BEEN TO PCP

## 2023-11-20 ENCOUNTER — TELEPHONE (OUTPATIENT)
Dept: INTERNAL MEDICINE | Facility: CLINIC | Age: 58
End: 2023-11-20
Payer: OTHER GOVERNMENT

## 2023-11-20 DIAGNOSIS — M62.838 MUSCLE SPASMS OF NECK: Primary | ICD-10-CM

## 2023-11-20 NOTE — TELEPHONE ENCOUNTER
Pharmacy Name: [facility] - [facility] [address] [facility] - [phone]  - [phone]      Pharmacy representative phone number: [phone]     What medication are you calling in regards to: CYCLOBENZAPRINE (FEXMID) 7.5 MG TABLET    What question does the pharmacy have: THEY ONLY HAVE 5 OR 10 MG TABLETS AND THEY ARE TOO TINY TO CUT IN HALF. PHARMACY WANTING TO CHANGE MEDICATION OR HAVE PRESCRIPTION SENT TO ANOTHER PHARMACY. PLEASE ADVISE.     Who is the provider that prescribed the medication: ILEANA TRACY

## 2023-11-22 RX ORDER — CYCLOBENZAPRINE HCL 5 MG
5 TABLET ORAL 3 TIMES DAILY PRN
Qty: 90 TABLET | Refills: 1 | Status: SHIPPED | OUTPATIENT
Start: 2023-11-22

## 2024-09-13 ENCOUNTER — OFFICE VISIT (OUTPATIENT)
Dept: INTERNAL MEDICINE | Facility: CLINIC | Age: 59
End: 2024-09-13
Payer: OTHER GOVERNMENT

## 2024-09-13 VITALS
DIASTOLIC BLOOD PRESSURE: 76 MMHG | WEIGHT: 180 LBS | HEART RATE: 92 BPM | HEIGHT: 65 IN | SYSTOLIC BLOOD PRESSURE: 126 MMHG | TEMPERATURE: 98 F | OXYGEN SATURATION: 98 % | BODY MASS INDEX: 29.99 KG/M2

## 2024-09-13 DIAGNOSIS — R23.9 SKIN COMPLAINTS: ICD-10-CM

## 2024-09-13 DIAGNOSIS — R09.89 SINUS COMPLAINT: Primary | ICD-10-CM

## 2024-09-13 PROCEDURE — 99213 OFFICE O/P EST LOW 20 MIN: CPT | Performed by: NURSE PRACTITIONER

## 2024-09-13 RX ORDER — GABAPENTIN 100 MG/1
100 CAPSULE ORAL 3 TIMES DAILY
COMMUNITY

## 2024-12-03 RX ORDER — AZITHROMYCIN 250 MG/1
TABLET, FILM COATED ORAL
Qty: 6 TABLET | Refills: 0 | Status: SHIPPED | OUTPATIENT
Start: 2024-12-03

## 2024-12-16 NOTE — PROGRESS NOTES
"Chief Complaint  Annual Exam and Influenza (Tested pos last Monday)    Subjective          Lizbeth Sheikh presents to [facility]  History of Present Illness    History of Present Illness    59-year-old female patient presents to the clinic today for her annual physical exam.  [facility]    Patient denies any concerns today including chest pain, shortness of breath, abdominal pain, nausea, vomiting, diarrhea.    Patient has chronic constipation controlled with Linzess.      Current Outpatient Medications   Medication Instructions    albuterol sulfate  (90 Base) MCG/ACT inhaler 2 puffs, Every 4 Hours PRN    cetirizine (zyrTEC) 10 MG tablet Take 1 tablet by mouth Daily.    Cholecalciferol 1,000 Units, Daily    Clindamycin Phos-Benzoyl Perox 1.2-5 % gel No dose, route, or frequency recorded.    cycloSPORINE (Restasis) 0.05 % ophthalmic emulsion 1 drop    Diclofenac Sodium (VOLTAREN) 4 g, Topical, 4 Times Daily PRN    fluticasone (FLONASE) 50 MCG/ACT nasal spray 2 sprays, Daily    gabapentin (NEURONTIN) 100 mg, 3 Times Daily    Lactobacillus (Acidophilus) capsule 1 capsule, Daily    linaclotide (LINZESS) 145 MCG capsule capsule Linzess 145 mcg oral capsule take 1 capsule (145 mcg) by oral route once daily on an empty stomach at least 30 minutes before 1st meal of the day   Active    spironolactone (ALDACTONE) 25 mg, Oral, Daily       The following portions of the patient's history were reviewed and updated as appropriate: allergies, current medications, past family history, past medical history, past social history, past surgical history, and problem list.    Objective   Vital Signs:   /74   Pulse 72   Temp 98.4 °F (36.9 °C)   Ht 165.1 cm (65\")   Wt 80.3 kg (177 lb)   SpO2 95%   BMI 29.45 kg/m²     BP Readings from Last 3 Encounters:   12/18/24 115/74   09/13/24 126/76   09/09/24 135/68     Wt Readings from Last 3 Encounters:   12/18/24 " 80.3 kg (177 lb)   09/13/24 81.6 kg (180 lb)   09/09/24 83.3 kg (183 lb 11.2 oz)           Physical Exam     Appearance: No acute distress, well-nourished  Head: normocephalic, atraumatic  Eyes: extraocular movements intact, no scleral icterus, no conjunctival injection  Ears, Nose, and Throat: external ears normal, nares patent, moist mucous membranes  Cardiovascular: regular rate and rhythm. no murmurs, rubs, or gallops. no edema  Respiratory: breathing comfortably, symmetric chest rise, clear to auscultation bilaterally. No wheezes, rales, or rhonchi.  Neuro: alert and oriented to time, place, and person. Normal gait  Psych: normal mood and affect     Physical Exam        Result Review :   The following data was reviewed by: DEMARCUS Porter on 12/18/2024:      Results           Lab Results   Component Value Date    SARSANTIGEN Not Detected 10/05/2022    FLUAAG Not Detected 10/05/2022    FLUBAG Not Detected 10/05/2022    RAPSCRN Negative 10/05/2022    INR 0.94 (L) 03/04/2021    BILIRUBINUR Negative 02/20/2022            Assessment and Plan    Diagnoses and all orders for this visit:    1. Annual physical exam (Primary)  -     Cancel: TSH Rfx On Abnormal To Free T4  -     Cancel: Lipid Panel  -     Cancel: Comprehensive Metabolic Panel  -     Cancel: CBC & Differential  -     CBC & Differential; Future  -     Comprehensive Metabolic Panel; Future  -     Lipid Panel; Future  -     TSH Rfx On Abnormal To Free T4; Future    2. Screening for thyroid disorder  -     Cancel: TSH Rfx On Abnormal To Free T4  -     TSH Rfx On Abnormal To Free T4; Future    3. Screening for lipid disorders  -     Cancel: Lipid Panel  -     Lipid Panel; Future    4. Influenza A    5. GERD without esophagitis  Comments:  Well-controlled on current regimen    6. Constipation, unspecified constipation type  Comments:  Well-controlled on current regimen    7. Mild intermittent asthma without complication  Comments:  Well-controlled on  current regimen    Other orders  -     spironolactone (ALDACTONE) 25 MG tablet; Take 1 tablet by mouth Daily.  Dispense: 90 tablet; Refill: 3      Advised on diet, physical activity, sunscreen, helmet, texting and driving, etc   Assessment & Plan      Medications Discontinued During This Encounter   Medication Reason    cyclobenzaprine (FLEXERIL) 5 MG tablet Historical Med - Therapy completed    omeprazole (priLOSEC) 40 MG capsule Historical Med - Therapy completed    colchicine 0.6 MG tablet Historical Med - Therapy completed    azithromycin (Zithromax Z-Chase) 250 MG tablet Historical Med - Therapy completed    spironolactone (ALDACTONE) 50 MG tablet Historical Med - Therapy completed    spironolactone (ALDACTONE) 25 MG tablet Reorder    spironolactone (ALDACTONE) 50 MG tablet Historical Med - Therapy completed          Follow Up   No follow-ups on file.  Patient was given instructions and counseling regarding her condition or for health maintenance advice. Please see specific information pulled into the AVS if appropriate.       DEMARCUS Porter  12/31/24  11:14 EST      Patient or patient representative verbalized consent for the use of Ambient Listening during the visit with  DEMARCUS Porter for chart documentation. 12/31/2024  10:13 EST

## 2024-12-18 ENCOUNTER — OFFICE VISIT (OUTPATIENT)
Dept: INTERNAL MEDICINE | Facility: CLINIC | Age: 59
End: 2024-12-18
Payer: OTHER GOVERNMENT

## 2024-12-18 VITALS
SYSTOLIC BLOOD PRESSURE: 115 MMHG | HEART RATE: 72 BPM | WEIGHT: 177 LBS | BODY MASS INDEX: 29.49 KG/M2 | TEMPERATURE: 98.4 F | HEIGHT: 65 IN | DIASTOLIC BLOOD PRESSURE: 74 MMHG | OXYGEN SATURATION: 95 %

## 2024-12-18 DIAGNOSIS — Z00.00 ANNUAL PHYSICAL EXAM: Primary | ICD-10-CM

## 2024-12-18 DIAGNOSIS — Z13.220 SCREENING FOR LIPID DISORDERS: ICD-10-CM

## 2024-12-18 DIAGNOSIS — J45.20 MILD INTERMITTENT ASTHMA WITHOUT COMPLICATION: Chronic | ICD-10-CM

## 2024-12-18 DIAGNOSIS — J10.1 INFLUENZA A: ICD-10-CM

## 2024-12-18 DIAGNOSIS — Z13.29 SCREENING FOR THYROID DISORDER: ICD-10-CM

## 2024-12-18 DIAGNOSIS — K21.9 GERD WITHOUT ESOPHAGITIS: Chronic | ICD-10-CM

## 2024-12-18 DIAGNOSIS — K59.00 CONSTIPATION, UNSPECIFIED CONSTIPATION TYPE: Chronic | ICD-10-CM

## 2024-12-18 PROCEDURE — 99396 PREV VISIT EST AGE 40-64: CPT | Performed by: NURSE PRACTITIONER

## 2024-12-18 PROCEDURE — 99214 OFFICE O/P EST MOD 30 MIN: CPT | Performed by: NURSE PRACTITIONER

## 2024-12-18 RX ORDER — SPIRONOLACTONE 25 MG/1
25 TABLET ORAL DAILY
Qty: 90 TABLET | Refills: 3 | Status: SHIPPED | OUTPATIENT
Start: 2024-12-18

## 2025-03-14 ENCOUNTER — TELEPHONE (OUTPATIENT)
Dept: INTERNAL MEDICINE | Facility: CLINIC | Age: 60
End: 2025-03-14
Payer: OTHER GOVERNMENT

## 2025-03-14 NOTE — TELEPHONE ENCOUNTER
Spoke with patient. Verified     Made her aware an appointment is needed before medication can be prescribed and that we recommend she go to urgent care to be seen.

## 2025-03-14 NOTE — TELEPHONE ENCOUNTER
Caller: Lizbeth Sheikh    Relationship: Self    Best call back number: [phone]     What medication are you requesting: Z-ROBIN OR SOMETHING FOR SINUS INFECTION    What are your current symptoms: SINUS PRESSURE BEHIND EYES, SORE THROAT, HEADACHE, CONGESTION.    How long have you been experiencing symptoms: SINCE 03.10.2025    Have you had these symptoms before:    [x] Yes  [] No    Have you been treated for these symptoms before:   [x] Yes  [] No    If a prescription is needed, what is your preferred pharmacy and phone number: [facility] - [address] [phone] [facility] [phone]      Additional notes: PATIENT TRIED TAKING SOME OVER THE COUNTER MEDICATIONS BUT THEY HAVE NOT HELPED.

## 2025-03-21 ENCOUNTER — OFFICE VISIT (OUTPATIENT)
Dept: OTOLARYNGOLOGY | Facility: CLINIC | Age: 60
End: 2025-03-21
Payer: OTHER GOVERNMENT

## 2025-03-21 VITALS
SYSTOLIC BLOOD PRESSURE: 117 MMHG | OXYGEN SATURATION: 100 % | DIASTOLIC BLOOD PRESSURE: 77 MMHG | TEMPERATURE: 98 F | HEART RATE: 78 BPM

## 2025-03-21 DIAGNOSIS — J32.9 CHRONIC RHINOSINUSITIS: Primary | ICD-10-CM

## 2025-03-21 DIAGNOSIS — G43.909 MIGRAINE WITHOUT STATUS MIGRAINOSUS, NOT INTRACTABLE, UNSPECIFIED MIGRAINE TYPE: ICD-10-CM

## 2025-03-21 PROCEDURE — 87070 CULTURE OTHR SPECIMN AEROBIC: CPT | Performed by: OTOLARYNGOLOGY

## 2025-03-21 PROCEDURE — 87205 SMEAR GRAM STAIN: CPT | Performed by: OTOLARYNGOLOGY

## 2025-03-21 RX ORDER — SPIRONOLACTONE 50 MG/1
TABLET, FILM COATED ORAL
COMMUNITY
Start: 2025-01-13

## 2025-03-21 RX ORDER — PREDNISONE 20 MG/1
40 TABLET ORAL DAILY
Qty: 10 TABLET | Refills: 0 | Status: SHIPPED | OUTPATIENT
Start: 2025-03-21 | End: 2025-03-26

## 2025-03-21 RX ORDER — BENZONATATE 200 MG/1
CAPSULE ORAL
COMMUNITY
Start: 2024-12-10

## 2025-03-21 RX ORDER — TIRZEPATIDE 2.5 MG/.5ML
INJECTION, SOLUTION SUBCUTANEOUS
COMMUNITY
Start: 2025-02-20

## 2025-03-21 RX ORDER — TRETINOIN 1 MG/G
CREAM TOPICAL
COMMUNITY
Start: 2025-01-13

## 2025-03-21 RX ORDER — OFLOXACIN 3 MG/ML
SOLUTION AURICULAR (OTIC)
COMMUNITY
Start: 2025-03-15

## 2025-03-21 RX ORDER — SUMATRIPTAN SUCCINATE 25 MG/1
TABLET ORAL
Qty: 6 TABLET | Refills: 2 | Status: SHIPPED | OUTPATIENT
Start: 2025-03-21

## 2025-03-21 NOTE — PROGRESS NOTES
"Patient Name: Lizbeth Sheikh   Visit Date: 03/21/2025   Patient ID: [id number]  Provider: Jaiden Fernández MD    Sex: female  Location: [facility]   YOB: 1965  Location Address: [address] [place] [address], [address],?[place]?[address]    Primary Care Provider Kristine Santiago APRN  Location Phone: [phone]    Referring Provider: MATILDE Porter*        Chief Complaint  Recurrent sinus issues    History of Present Illness  Lizbeth Sheikh is a 60 y.o. female who returns today for follow-up.  She was originally seen on 5/11/2022 at which time she reported \"constant sinus infections\".  Her typical infection involves headache, nasal congestion, earaches, occasional nausea, and occasional photophobia.  They typically last 7 to 10 days in duration and will often resolve without intervention.  She has not had any issues with rhinorrhea but does report a postnasal drainage.  She denied any hyposmia, cough, fevers, or chills.  She has tried Sudafed, saline irrigations, Flonase, and antibiotics without significant improvement.  She was on weekly immunotherapy.  She underwent endoscopic bilateral maxillary antrostomies and partial anterior ethmoidectomies at the [facility] in [place] in July 2021.  She does not feel like she has done any better since.  She denied any bruxism. CT scan of the sinuses without contrast on 4/19/2022 revealed left greater than right maxillary sinus mucous retention cysts, anterior septal deviation, and evidence of previous maxillary antrostomies and partial anterior ethmoidectomies.  Examination that day was unremarkable aside from a right nasal septal deviation and bilateral inferior turban hypertrophy.  After a thorough discussion she elected to return when acutely symptomatic.    She returns today for follow-up having last been seen on 11/28/2022.  Please see the note for further details. She tells me that she continues to experience frequent " retro-orbital pain, otalgia, and a burning sensatio when she breathes through her nose.  This has been associated with nausea, photophobia, and occasional thick rhinorrhea.  She denies any nasal congestion.  She has been using saline sprays and Sudafed.  She is finishing up a course of Augmentin.  She has not noticed any improvement in her symptoms.  Her symptoms have been worse since December when she contracted influenza.      Past Medical History:   Diagnosis Date    Anemia     Asthma     Colon abnormality     GERD (gastroesophageal reflux disease)     Heart murmur     Hemorrhoids     Hernia cerebri     Night sweats        Past Surgical History:   Procedure Laterality Date    BREAST BIOPSY Right     COLONOSCOPY  2009    FORT CALLAWAY    HYSTERECTOMY           Current Outpatient Medications:     amoxicillin-clavulanate (AUGMENTIN) 875-125 MG per tablet, , Disp: , Rfl:     benzonatate (TESSALON) 200 MG capsule, TAKE ONE (1) CAPSULE(S) BY MOUTH 2 TO 3 TIMES PER DAY FOR COUGH., Disp: , Rfl:     cetirizine (zyrTEC) 10 MG tablet, Take 1 tablet by mouth Daily., Disp: , Rfl:     Cholecalciferol 25 MCG (1000 UT) capsule, Take 1 capsule by mouth Daily., Disp: , Rfl:     Clindamycin Phos-Benzoyl Perox 1.2-5 % gel, , Disp: , Rfl:     cycloSPORINE (Restasis) 0.05 % ophthalmic emulsion, 1 drop., Disp: , Rfl:     fluticasone (FLONASE) 50 MCG/ACT nasal spray, Administer 2 sprays into the nostril(s) as directed by provider Daily., Disp: , Rfl:     gabapentin (NEURONTIN) 100 MG capsule, Take 1 capsule by mouth 3 (Three) Times a Day., Disp: , Rfl:     Lactobacillus (Acidophilus) capsule, Take 1 capsule by mouth Daily., Disp: , Rfl:     linaclotide (LINZESS) 145 MCG capsule capsule, Linzess 145 mcg oral capsule take 1 capsule (145 mcg) by oral route once daily on an empty stomach at least 30 minutes before 1st meal of the day   Active, Disp: , Rfl:     ofloxacin (FLOXIN) 0.3 % otic solution, , Disp: , Rfl:     spironolactone (ALDACTONE)  50 MG tablet, TAKE ONE TABLET BY MOUTH EVERY DAY FOR ACNE WITH FULL GLASS OF WATER, Disp: , Rfl:     tretinoin (RETIN-A) 0.1 % cream, APPLY A PEA SIZED AMOUNT TO FACE EVERY NIGHT FOR ACNE, Disp: , Rfl:     Zepbound 2.5 MG/0.5ML solution auto-injector, , Disp: , Rfl:     albuterol sulfate  (90 Base) MCG/ACT inhaler, Inhale 2 puffs Every 4 (Four) Hours As Needed for Wheezing or Shortness of Air., Disp: , Rfl:     Diclofenac Sodium (VOLTAREN) 1 % gel gel, Apply 4 g topically to the appropriate area as directed 4 (Four) Times a Day As Needed (pain)., Disp: 350 g, Rfl: 0    predniSONE (DELTASONE) 20 MG tablet, Take 2 tablets by mouth Daily for 5 days., Disp: 10 tablet, Rfl: 0    SUMAtriptan (Imitrex) 25 MG tablet, Take one tablet at onset of headache. May repeat dose one time in 2 hours if headache not relieved., Disp: 6 tablet, Rfl: 2     Allergies   Allergen Reactions    Iodine Shortness Of Breath and Swelling    Latex Rash       Social History     Tobacco Use    Smoking status: Former     Current packs/day: 0.00     Average packs/day: 1 pack/day for 20.0 years (20.0 ttl pk-yrs)     Types: Cigarettes     Start date:      Quit date:      Years since quittin.2    Smokeless tobacco: Never   Vaping Use    Vaping status: Never Used   Substance Use Topics    Alcohol use: Never    Drug use: Never        Objective     Vital Signs:   /77   Pulse 78   Temp 98 °F (36.7 °C)   SpO2 100%       Physical Exam    General: Well developed, well nourished patient of stated age in no acute distress. Voice is strong and clear.   Head: Normocephalic and atraumatic.  Face: No lesions.  Bilateral parotid and submandibular glands are unremarkable.  Stensen's and Warthin's ducts are productive of clear saliva bilaterally.  House-Brackmann I/VI     bilaterally.   muscles and temporomandibular joint nontender to palpation.  TMJ crepitus.  Eyes: PERRLA, sclerae anicteric, no conjunctival injection. Extraocular  movements are intact and full. No nystagmus.   Ears: Auricles are normal in appearance. Bilateral external auditory canals are unremarkable. Bilateral tympanic membranes are clear and without effusion. Hearing normal to conversational voice.   Nose: External nose is normal in appearance. Bilateral nares are patent with normal appearing mucosa. Septum deviated to the right.  Bilateral inferior turbinates are hypertrophied. No lesions.   Oral Cavity: Lips are normal in appearance. Oral mucosa is unremarkable. Gingiva is unremarkable. Normal dentition for age. Tongue is unremarkable with good movement. Hard palate is unremarkable.   Oropharynx: Soft palate is unremarkable with full movement. Uvula is unremarkable. Bilateral tonsils are unremarkable. Posterior oropharynx is unremarkable.    Larynx and hypopharynx: Deferred secondary to gag reflex.  Neck: Supple.  No mass.  Nontender to palpation.  Trachea midline. Thyroid normal size and without nodules to palpation.   Lymphatic: No lymphadenopathy upon palpation.   Psychiatric: Appropriate affect, cooperative   Neurologic: Oriented x 3, strength symmetric in all extremities, Cranial Nerves II-XII are grossly intact to confrontation   Skin: Warm and dry. No rashes.    Procedures   Diagnostic nasal endoscopy:    Indications: Bilateral nasal congestion in a patient with inability to visualize the middle meatus on anterior rhinoscopy.    Summary: Patient's bilateral nares were decongested and anesthetized with Afrin and lidocaine sprays respectively. After giving the medications ample time to take effect a 0° rigid endoscope was inserted in the bilateral nares revealing a right anteriorly deviated nasal septum. The bilateral inferior are hypertrophied.  The middle turbinates were normal in appearance. The bilateral maxillary antrostomies and anterior ethmoidectomies are widely patent.  There is a small amount of crusting on the left.  A sample was obtained for culture and  sensitivities.  The olfactory clefts and sphenoethmoidal recesses were without pus, polyps, or lesion bilaterally. The nasopharynx and bilateral eustachian tube orifices were without mass or lesion. The nasal mucosa was normal in appearance. The patient tolerated the procedure well.        Result Review :               Assessment and Plan    Diagnoses and all orders for this visit:    1. Chronic rhinosinusitis (Primary)  -     Wound Culture - Wound, Sinus, maxillary left  -     predniSONE (DELTASONE) 20 MG tablet; Take 2 tablets by mouth Daily for 5 days.  Dispense: 10 tablet; Refill: 0    2. Migraine without status migrainosus, not intractable, unspecified migraine type  -     SUMAtriptan (Imitrex) 25 MG tablet; Take one tablet at onset of headache. May repeat dose one time in 2 hours if headache not relieved.  Dispense: 6 tablet; Refill: 2        Impressions and findings were again discussed at great length.  Currently, she is seen today for follow-up of frequent retro-orbital pain, otalgia, nausea, photophobia, and a burning sensation in her nose.  Examination today revealed widely patent maxillary antrostomies and anterior ethmoidectomies with a small amount of crusting present around the left maxillary antrostomy.  A sample was obtained for culture and sensitivities.  We discussed my concern that the majority of her signs and symptoms are likely secondary to migraine disorder but we we will await her culture results prior to determining further potential antibiotic therapy.  She may consider restarting her nasal steroid spray and saline irrigation.  She will also be tried on sumatriptan and prednisone.  We discussed that if her symptoms not improved may be helpful to obtain a follow-up CT scan of her sinuses. She was given ample time to ask questions, all of which were answered to her satisfaction.    Follow Up   No follow-ups on file.  Patient was given instructions and counseling regarding her condition or  for health maintenance advice. Please see specific information pulled into the AVS if appropriate.

## 2025-03-23 LAB
BACTERIA SPEC AEROBE CULT: NORMAL
GRAM STN SPEC: NORMAL

## 2025-03-27 ENCOUNTER — DOCUMENTATION (OUTPATIENT)
Dept: OTOLARYNGOLOGY | Facility: CLINIC | Age: 60
End: 2025-03-27
Payer: OTHER GOVERNMENT

## 2025-03-27 NOTE — PROGRESS NOTES
Left a message telling her her culture results were available and that antibiotic could be called in if she is still feeling poorly.